# Patient Record
Sex: FEMALE | Race: WHITE | Employment: FULL TIME | URBAN - METROPOLITAN AREA
[De-identification: names, ages, dates, MRNs, and addresses within clinical notes are randomized per-mention and may not be internally consistent; named-entity substitution may affect disease eponyms.]

---

## 2020-02-24 ENCOUNTER — HOSPITAL ENCOUNTER (INPATIENT)
Age: 65
LOS: 4 days | Discharge: HOME | DRG: 603 | End: 2020-02-28
Attending: FAMILY MEDICINE | Admitting: INTERNAL MEDICINE
Payer: COMMERCIAL

## 2020-02-24 ENCOUNTER — APPOINTMENT (OUTPATIENT)
Dept: CT IMAGING | Age: 65
End: 2020-02-24
Attending: EMERGENCY MEDICINE
Payer: COMMERCIAL

## 2020-02-24 ENCOUNTER — HOSPITAL ENCOUNTER (EMERGENCY)
Age: 65
Discharge: ANOTHER HOSPITAL | End: 2020-02-24
Attending: EMERGENCY MEDICINE
Payer: COMMERCIAL

## 2020-02-24 VITALS
DIASTOLIC BLOOD PRESSURE: 76 MMHG | WEIGHT: 204.59 LBS | HEART RATE: 74 BPM | OXYGEN SATURATION: 97 % | HEIGHT: 66 IN | BODY MASS INDEX: 32.88 KG/M2 | RESPIRATION RATE: 16 BRPM | SYSTOLIC BLOOD PRESSURE: 136 MMHG | TEMPERATURE: 98.1 F

## 2020-02-24 DIAGNOSIS — H44.002: ICD-10-CM

## 2020-02-24 DIAGNOSIS — L03.213 PERIORBITAL CELLULITIS OF LEFT EYE: Primary | ICD-10-CM

## 2020-02-24 LAB
ALBUMIN SERPL-MCNC: 4.1 G/DL (ref 3.5–5)
ALBUMIN/GLOB SERPL: 1.1 {RATIO} (ref 1.1–2.2)
ALP SERPL-CCNC: 78 U/L (ref 45–117)
ALT SERPL-CCNC: 18 U/L (ref 12–78)
ANION GAP SERPL CALC-SCNC: 12 MMOL/L (ref 5–15)
AST SERPL-CCNC: 18 U/L (ref 15–37)
BASOPHILS # BLD: 0 K/UL (ref 0–0.1)
BASOPHILS NFR BLD: 0 % (ref 0–1)
BILIRUB SERPL-MCNC: 0.2 MG/DL (ref 0.2–1)
BUN SERPL-MCNC: 23 MG/DL (ref 6–20)
BUN/CREAT SERPL: 24 (ref 12–20)
CALCIUM SERPL-MCNC: 9.5 MG/DL (ref 8.5–10.1)
CHLORIDE SERPL-SCNC: 103 MMOL/L (ref 97–108)
CO2 SERPL-SCNC: 27 MMOL/L (ref 21–32)
CREAT SERPL-MCNC: 0.94 MG/DL (ref 0.55–1.02)
DIFFERENTIAL METHOD BLD: NORMAL
EOSINOPHIL # BLD: 0 K/UL (ref 0–0.4)
EOSINOPHIL NFR BLD: 0 % (ref 0–7)
ERYTHROCYTE [DISTWIDTH] IN BLOOD BY AUTOMATED COUNT: 13.1 % (ref 11.5–14.5)
GLOBULIN SER CALC-MCNC: 3.7 G/DL (ref 2–4)
GLUCOSE SERPL-MCNC: 93 MG/DL (ref 65–100)
HCT VFR BLD AUTO: 40 % (ref 35–47)
HGB BLD-MCNC: 12.8 G/DL (ref 11.5–16)
LYMPHOCYTES # BLD: 1.8 K/UL (ref 0.8–3.5)
LYMPHOCYTES NFR BLD: 22 % (ref 12–49)
MCH RBC QN AUTO: 29.1 PG (ref 26–34)
MCHC RBC AUTO-ENTMCNC: 32 G/DL (ref 30–36.5)
MCV RBC AUTO: 90.9 FL (ref 80–99)
MONOCYTES # BLD: 0.6 K/UL (ref 0–1)
MONOCYTES NFR BLD: 8 % (ref 5–13)
NEUTS SEG # BLD: 5.8 K/UL (ref 1.8–8)
NEUTS SEG NFR BLD: 70 % (ref 32–75)
PLATELET # BLD AUTO: 229 K/UL (ref 150–400)
PMV BLD AUTO: 9.2 FL (ref 8.9–12.9)
POTASSIUM SERPL-SCNC: 3.5 MMOL/L (ref 3.5–5.1)
PROT SERPL-MCNC: 7.8 G/DL (ref 6.4–8.2)
RBC # BLD AUTO: 4.4 M/UL (ref 3.8–5.2)
SODIUM SERPL-SCNC: 142 MMOL/L (ref 136–145)
WBC # BLD AUTO: 8.3 K/UL (ref 3.6–11)

## 2020-02-24 PROCEDURE — 36415 COLL VENOUS BLD VENIPUNCTURE: CPT

## 2020-02-24 PROCEDURE — 96366 THER/PROPH/DIAG IV INF ADDON: CPT

## 2020-02-24 PROCEDURE — 80053 COMPREHEN METABOLIC PANEL: CPT

## 2020-02-24 PROCEDURE — 74011636320 HC RX REV CODE- 636/320: Performed by: EMERGENCY MEDICINE

## 2020-02-24 PROCEDURE — 70481 CT ORBIT/EAR/FOSSA W/DYE: CPT

## 2020-02-24 PROCEDURE — 65660000000 HC RM CCU STEPDOWN

## 2020-02-24 PROCEDURE — 74011000250 HC RX REV CODE- 250: Performed by: EMERGENCY MEDICINE

## 2020-02-24 PROCEDURE — 96365 THER/PROPH/DIAG IV INF INIT: CPT

## 2020-02-24 PROCEDURE — 99285 EMERGENCY DEPT VISIT HI MDM: CPT

## 2020-02-24 PROCEDURE — 74011250636 HC RX REV CODE- 250/636: Performed by: EMERGENCY MEDICINE

## 2020-02-24 PROCEDURE — 85025 COMPLETE CBC W/AUTO DIFF WBC: CPT

## 2020-02-24 PROCEDURE — 96375 TX/PRO/DX INJ NEW DRUG ADDON: CPT

## 2020-02-24 RX ORDER — VANCOMYCIN HYDROCHLORIDE
1250
Status: DISCONTINUED | OUTPATIENT
Start: 2020-02-25 | End: 2020-02-28 | Stop reason: HOSPADM

## 2020-02-24 RX ORDER — KETOROLAC TROMETHAMINE 30 MG/ML
15 INJECTION, SOLUTION INTRAMUSCULAR; INTRAVENOUS
Status: DISPENSED | OUTPATIENT
Start: 2020-02-24 | End: 2020-02-27

## 2020-02-24 RX ORDER — LORAZEPAM 0.5 MG/1
0.5 TABLET ORAL
COMMUNITY

## 2020-02-24 RX ORDER — CIPROFLOXACIN HYDROCHLORIDE 3.5 MG/ML
1 SOLUTION/ DROPS TOPICAL ONCE
Status: COMPLETED | OUTPATIENT
Start: 2020-02-24 | End: 2020-02-24

## 2020-02-24 RX ORDER — OFLOXACIN 3 MG/ML
1 SOLUTION/ DROPS OPHTHALMIC ONCE
Status: DISCONTINUED | OUTPATIENT
Start: 2020-02-24 | End: 2020-02-24 | Stop reason: CLARIF

## 2020-02-24 RX ORDER — MORPHINE SULFATE 2 MG/ML
2 INJECTION, SOLUTION INTRAMUSCULAR; INTRAVENOUS
Status: DISCONTINUED | OUTPATIENT
Start: 2020-02-24 | End: 2020-02-28 | Stop reason: HOSPADM

## 2020-02-24 RX ORDER — CLINDAMYCIN PHOSPHATE 600 MG/50ML
600 INJECTION, SOLUTION INTRAVENOUS
Status: COMPLETED | OUTPATIENT
Start: 2020-02-24 | End: 2020-02-24

## 2020-02-24 RX ORDER — ACETAMINOPHEN 325 MG/1
650 TABLET ORAL
Status: DISCONTINUED | OUTPATIENT
Start: 2020-02-24 | End: 2020-02-28 | Stop reason: HOSPADM

## 2020-02-24 RX ORDER — METHYLPREDNISOLONE 4 MG/1
4 TABLET ORAL
COMMUNITY
Start: 2020-02-23

## 2020-02-24 RX ORDER — PRAVASTATIN SODIUM 20 MG/1
20 TABLET ORAL
COMMUNITY

## 2020-02-24 RX ADMIN — VANCOMYCIN HYDROCHLORIDE 1000 MG: 1 INJECTION, POWDER, LYOPHILIZED, FOR SOLUTION INTRAVENOUS at 19:58

## 2020-02-24 RX ADMIN — VANCOMYCIN HYDROCHLORIDE 1000 MG: 1 INJECTION, POWDER, LYOPHILIZED, FOR SOLUTION INTRAVENOUS at 21:04

## 2020-02-24 RX ADMIN — IOPAMIDOL 100 ML: 755 INJECTION, SOLUTION INTRAVENOUS at 17:52

## 2020-02-24 RX ADMIN — CIPROFLOXACIN 1 DROP: 3 SOLUTION OPHTHALMIC at 20:02

## 2020-02-24 RX ADMIN — CLINDAMYCIN PHOSPHATE 600 MG: 600 INJECTION, SOLUTION INTRAVENOUS at 16:52

## 2020-02-24 NOTE — ED NOTES
IV access established and blood samples obtained for ordered testing. Patient tolerated well. Updated regarding plan of care and associated time constraints; patient verbalizes understanding and agreement. Call bell in reach. Lights dimmed for comfort.

## 2020-02-24 NOTE — ED PROVIDER NOTES
27-year-old female with history of ovarian cancer status post chemotherapy presents with complaint of left eye pain and swelling. She reports she awoke this morning and her eye felt dry like she needed to put drops in it. She used some lubricating drops and shortly after her eyelid began to swell and she had drainage from the eye. She denies any photophobia of the eye. She reports the swelling is increased and has been draining a large amount. She denies any fevers or recent cold symptoms. She was seen at an urgent care over the weekend and got a shot of steroids this past weekend for a sore throat. She is on an oral chemotherapy medication. Patient wears glasses but does not wear contacts. Past Medical History:   Diagnosis Date    Cancer ([facility]     ovarian cancer w/chemo    High cholesterol     [facility]        Past Surgical History:   Procedure Laterality Date    HX BILATERAL MASTECTOMY      HX CHOLECYSTECTOMY      HX HYACINTH AND BSO      HX TONSILLECTOMY           History reviewed. No pertinent family history.     Social History     Socioeconomic History    Marital status: Not on file     Spouse name: Not on file    Number of children: Not on file    Years of education: Not on file    Highest education level: Not on file   Occupational History    Not on file   Social Needs    Financial resource strain: Not on file    Food insecurity:     Worry: Not on file     Inability: Not on file    Transportation needs:     Medical: Not on file     Non-medical: Not on file   Tobacco Use    Smoking status: Never Smoker    Smokeless tobacco: Never Used   Substance and Sexual Activity    Alcohol use: Yes     Comment: rarely    Drug use: Never    Sexual activity: Not on file   Lifestyle    Physical activity:     Days per week: Not on file     Minutes per session: Not on file    Stress: Not on file   Relationships    Social connections:     Talks on phone: Not on file     Gets together: Not on file     Attends Yazidi service: Not on file     Active member of club or organization: Not on file     Attends meetings of clubs or organizations: Not on file     Relationship status: Not on file    Intimate partner violence:     Fear of current or ex partner: Not on file     Emotionally abused: Not on file     Physically abused: Not on file     Forced sexual activity: Not on file   Other Topics Concern    Not on file   Social History Narrative    Not on file         ALLERGIES: Amoxicillin    Review of Systems   Constitutional: Negative for fever. Eyes: Positive for pain. Negative for photophobia and visual disturbance. Respiratory: Negative for shortness of breath. Cardiovascular: Negative for chest pain. All other systems reviewed and are negative. Vitals:    02/24/20 1618   BP: 173/86   Pulse: 83   Resp: 14   Temp: 98.1 °F (36.7 °C)   SpO2: 96%   Weight: 92.8 kg (204 lb 9.4 oz)            Physical Exam  Vitals signs and nursing note reviewed. Constitutional:       Appearance: She is well-developed. HENT:      Head: Normocephalic and atraumatic. Eyes:      Extraocular Movements: Extraocular movements intact. Pupils: Pupils are equal, round, and reactive to light. Comments: Amount of chemosis of patient's left eye with drainage and crusting to her lashes and periorbital swelling and erythema   Neck:      Musculoskeletal: Normal range of motion. Cardiovascular:      Rate and Rhythm: Normal rate and regular rhythm. Pulses: Normal pulses. Heart sounds: Normal heart sounds. No murmur. No friction rub. No gallop. Pulmonary:      Effort: Pulmonary effort is normal. No respiratory distress. Breath sounds: Normal breath sounds. No stridor. No wheezing or rhonchi. Abdominal:      General: Bowel sounds are normal. There is no distension. Palpations: Abdomen is soft. There is no mass. Tenderness: There is no abdominal tenderness.  There is no guarding or rebound. Hernia: No hernia is present. Musculoskeletal: Normal range of motion. Skin:     General: Skin is warm and dry. Neurological:      Mental Status: She is alert and oriented to person, place, and time. Cranial Nerves: No cranial nerve deficit. Sensory: No sensory deficit. Motor: No weakness. Coordination: Coordination normal.          MDM  Number of Diagnoses or Management Options  Abscess of eye, left:   Periorbital cellulitis of left eye:   Diagnosis management comments: Patient with some chemosis of her eye and appears to have a conjunctivitis however I am concerned that it is progressed to at minimum a periorbital cellulitis but may be entering into an orbital cellulitis as she is immunosuppressed. Also appears like it is occurred rapidly. I will obtain imaging. There may be some component of an allergic reaction and inflammatory response of the conjunctiva with ecchymosis post using the over-the-counter eyedrop. Amount and/or Complexity of Data Reviewed  Clinical lab tests: ordered and reviewed  Tests in the radiology section of CPT®: ordered and reviewed  Discuss the patient with other providers: yes (Ophthalmology and hospitalist)    Patient Progress  Patient progress: stable         Procedures      6:35 PM spoke with dr aquino, radiologist who re-reviewed ct and there is no posteiror involvement but pt does have anterior globe fluid collection. Paged Iven Monsalve eye     6:50 PM  Spoke with Dr. Luis Torres, on-call for [facility] and discussed patient CT findings. He agrees with admission for IV antibiotics and will see patient tomorrow in the hospital.  He reports the only hospital they go to an staff at [facility]. I have discussed with the patient she is willing to go there. He also recommends topical antibiotics such as moxifloxacin or ofloxacin.    7:03 PM  Awaiting [facility] Hospitalist to call back.     [facility]  7:05 PM    ED Room Number: WER03/03  Patient Name and age:  Deepika Stephens 62 y.o.  female  Working Diagnosis:   1. Periorbital cellulitis of left eye    2. Abscess of eye, left      Readmission: no  Isolation Requirements:  no  Recommended Level of Care:  med/surg  Code Status:  Full Code  Department:[place] [facility] - [phone]  Other:  Pt with periorbital cellulitis but also has fluid collection anteriorly. Spoke with TERRANCE. They will see her tomorrow at [facility]. They state that is the only place they staff    7:11 PM  Woke with Dr. Luz Jaime hospitalist accepts patient for admission. 1. Periorbital cellulitis of left eye    2.  Abscess of eye, left

## 2020-02-24 NOTE — ED TRIAGE NOTES
Pt ambulates to treatment area she states that this morning when she got up her left eye felt like it needed some moisture drops in it so she put a couple of drops in the eye. Then about 1100 her eye began to become red and have some drainage. Now the left eye is swollen with quite a bit of drainage and is becoming more painful.

## 2020-02-24 NOTE — ED NOTES
IV clindamycin infusing via pump without difficulty as ordered. Patient voices no further requests or concerns at this time. Call bell remains in reach. Will continue to monitor.

## 2020-02-25 LAB
ANION GAP SERPL CALC-SCNC: 7 MMOL/L (ref 5–15)
APPEARANCE UR: CLEAR
BACTERIA URNS QL MICRO: NEGATIVE /HPF
BASOPHILS # BLD: 0 K/UL (ref 0–0.1)
BASOPHILS NFR BLD: 0 % (ref 0–1)
BILIRUB UR QL: NEGATIVE
BUN SERPL-MCNC: 17 MG/DL (ref 6–20)
BUN/CREAT SERPL: 18 (ref 12–20)
CALCIUM SERPL-MCNC: 8.8 MG/DL (ref 8.5–10.1)
CHLORIDE SERPL-SCNC: 105 MMOL/L (ref 97–108)
CHOLEST SERPL-MCNC: 212 MG/DL
CO2 SERPL-SCNC: 26 MMOL/L (ref 21–32)
COLOR UR: ABNORMAL
CREAT SERPL-MCNC: 0.96 MG/DL (ref 0.55–1.02)
CRP SERPL-MCNC: <0.29 MG/DL (ref 0–0.6)
DIFFERENTIAL METHOD BLD: NORMAL
EOSINOPHIL # BLD: 0.1 K/UL (ref 0–0.4)
EOSINOPHIL NFR BLD: 1 % (ref 0–7)
EPITH CASTS URNS QL MICRO: ABNORMAL /LPF
ERYTHROCYTE [DISTWIDTH] IN BLOOD BY AUTOMATED COUNT: 13.2 % (ref 11.5–14.5)
GLUCOSE SERPL-MCNC: 90 MG/DL (ref 65–100)
GLUCOSE UR STRIP.AUTO-MCNC: NEGATIVE MG/DL
HCT VFR BLD AUTO: 37.3 % (ref 35–47)
HDLC SERPL-MCNC: 49 MG/DL
HDLC SERPL: 4.3 {RATIO} (ref 0–5)
HGB BLD-MCNC: 12.1 G/DL (ref 11.5–16)
HGB UR QL STRIP: NEGATIVE
HYALINE CASTS URNS QL MICRO: ABNORMAL /LPF (ref 0–5)
IMM GRANULOCYTES # BLD AUTO: 0 K/UL (ref 0–0.04)
IMM GRANULOCYTES NFR BLD AUTO: 0 % (ref 0–0.5)
KETONES UR QL STRIP.AUTO: NEGATIVE MG/DL
LDLC SERPL CALC-MCNC: 122.2 MG/DL (ref 0–100)
LEUKOCYTE ESTERASE UR QL STRIP.AUTO: ABNORMAL
LIPID PROFILE,FLP: ABNORMAL
LYMPHOCYTES # BLD: 2 K/UL (ref 0.8–3.5)
LYMPHOCYTES NFR BLD: 30 % (ref 12–49)
MAGNESIUM SERPL-MCNC: 1.4 MG/DL (ref 1.6–2.4)
MCH RBC QN AUTO: 29.5 PG (ref 26–34)
MCHC RBC AUTO-ENTMCNC: 32.4 G/DL (ref 30–36.5)
MCV RBC AUTO: 91 FL (ref 80–99)
MONOCYTES # BLD: 0.7 K/UL (ref 0–1)
MONOCYTES NFR BLD: 10 % (ref 5–13)
NEUTS SEG # BLD: 3.9 K/UL (ref 1.8–8)
NEUTS SEG NFR BLD: 59 % (ref 32–75)
NITRITE UR QL STRIP.AUTO: NEGATIVE
NRBC # BLD: 0 K/UL (ref 0–0.01)
NRBC BLD-RTO: 0 PER 100 WBC
PH UR STRIP: 6 [PH] (ref 5–8)
PHOSPHATE SERPL-MCNC: 4.3 MG/DL (ref 2.6–4.7)
PLATELET # BLD AUTO: 216 K/UL (ref 150–400)
PMV BLD AUTO: 9.6 FL (ref 8.9–12.9)
POTASSIUM SERPL-SCNC: 3.6 MMOL/L (ref 3.5–5.1)
PROT UR STRIP-MCNC: NEGATIVE MG/DL
RBC # BLD AUTO: 4.1 M/UL (ref 3.8–5.2)
RBC #/AREA URNS HPF: ABNORMAL /HPF (ref 0–5)
SODIUM SERPL-SCNC: 138 MMOL/L (ref 136–145)
SP GR UR REFRACTOMETRY: 1.02 (ref 1–1.03)
TRIGL SERPL-MCNC: 204 MG/DL (ref ?–150)
UROBILINOGEN UR QL STRIP.AUTO: 0.2 EU/DL (ref 0.2–1)
VLDLC SERPL CALC-MCNC: 40.8 MG/DL
WBC # BLD AUTO: 6.6 K/UL (ref 3.6–11)
WBC URNS QL MICRO: ABNORMAL /HPF (ref 0–4)

## 2020-02-25 PROCEDURE — 74011250636 HC RX REV CODE- 250/636: Performed by: INTERNAL MEDICINE

## 2020-02-25 PROCEDURE — 83735 ASSAY OF MAGNESIUM: CPT

## 2020-02-25 PROCEDURE — 94760 N-INVAS EAR/PLS OXIMETRY 1: CPT

## 2020-02-25 PROCEDURE — 36415 COLL VENOUS BLD VENIPUNCTURE: CPT

## 2020-02-25 PROCEDURE — 81001 URINALYSIS AUTO W/SCOPE: CPT

## 2020-02-25 PROCEDURE — 80061 LIPID PANEL: CPT

## 2020-02-25 PROCEDURE — 84100 ASSAY OF PHOSPHORUS: CPT

## 2020-02-25 PROCEDURE — 74011000258 HC RX REV CODE- 258: Performed by: INTERNAL MEDICINE

## 2020-02-25 PROCEDURE — 74011250637 HC RX REV CODE- 250/637: Performed by: INTERNAL MEDICINE

## 2020-02-25 PROCEDURE — 85025 COMPLETE CBC W/AUTO DIFF WBC: CPT

## 2020-02-25 PROCEDURE — 87040 BLOOD CULTURE FOR BACTERIA: CPT

## 2020-02-25 PROCEDURE — 65270000029 HC RM PRIVATE

## 2020-02-25 PROCEDURE — 80048 BASIC METABOLIC PNL TOTAL CA: CPT

## 2020-02-25 PROCEDURE — 86140 C-REACTIVE PROTEIN: CPT

## 2020-02-25 RX ORDER — BISACODYL 5 MG
5 TABLET, DELAYED RELEASE (ENTERIC COATED) ORAL DAILY PRN
Status: DISCONTINUED | OUTPATIENT
Start: 2020-02-25 | End: 2020-02-28 | Stop reason: HOSPADM

## 2020-02-25 RX ORDER — ONDANSETRON 2 MG/ML
4 INJECTION INTRAMUSCULAR; INTRAVENOUS
Status: DISCONTINUED | OUTPATIENT
Start: 2020-02-25 | End: 2020-02-28 | Stop reason: HOSPADM

## 2020-02-25 RX ORDER — SODIUM CHLORIDE 0.9 % (FLUSH) 0.9 %
5-40 SYRINGE (ML) INJECTION EVERY 8 HOURS
Status: DISCONTINUED | OUTPATIENT
Start: 2020-02-25 | End: 2020-02-28 | Stop reason: HOSPADM

## 2020-02-25 RX ORDER — SODIUM CHLORIDE 0.9 % (FLUSH) 0.9 %
5-40 SYRINGE (ML) INJECTION AS NEEDED
Status: DISCONTINUED | OUTPATIENT
Start: 2020-02-25 | End: 2020-02-28 | Stop reason: HOSPADM

## 2020-02-25 RX ORDER — PRAVASTATIN SODIUM 20 MG/1
20 TABLET ORAL
Status: DISCONTINUED | OUTPATIENT
Start: 2020-02-25 | End: 2020-02-28 | Stop reason: HOSPADM

## 2020-02-25 RX ORDER — LORAZEPAM 0.5 MG/1
0.5 TABLET ORAL DAILY PRN
Status: DISCONTINUED | OUTPATIENT
Start: 2020-02-25 | End: 2020-02-28 | Stop reason: HOSPADM

## 2020-02-25 RX ORDER — METRONIDAZOLE 500 MG/100ML
500 INJECTION, SOLUTION INTRAVENOUS EVERY 8 HOURS
Status: DISCONTINUED | OUTPATIENT
Start: 2020-02-25 | End: 2020-02-28 | Stop reason: HOSPADM

## 2020-02-25 RX ADMIN — ONDANSETRON 4 MG: 2 INJECTION INTRAMUSCULAR; INTRAVENOUS at 14:41

## 2020-02-25 RX ADMIN — Medication 10 ML: at 13:05

## 2020-02-25 RX ADMIN — VANCOMYCIN HYDROCHLORIDE 1250 MG: 10 INJECTION, POWDER, LYOPHILIZED, FOR SOLUTION INTRAVENOUS at 13:05

## 2020-02-25 RX ADMIN — PRAVASTATIN SODIUM 20 MG: 20 TABLET ORAL at 22:20

## 2020-02-25 RX ADMIN — ONDANSETRON 4 MG: 2 INJECTION INTRAMUSCULAR; INTRAVENOUS at 20:48

## 2020-02-25 RX ADMIN — CEFEPIME HYDROCHLORIDE 2 G: 2 INJECTION, POWDER, FOR SOLUTION INTRAVENOUS at 01:22

## 2020-02-25 RX ADMIN — Medication 10 ML: at 20:49

## 2020-02-25 RX ADMIN — LORAZEPAM 0.5 MG: 0.5 TABLET ORAL at 21:32

## 2020-02-25 RX ADMIN — ONDANSETRON 4 MG: 2 INJECTION INTRAMUSCULAR; INTRAVENOUS at 10:48

## 2020-02-25 RX ADMIN — CEFEPIME HYDROCHLORIDE 2 G: 2 INJECTION, POWDER, FOR SOLUTION INTRAVENOUS at 11:55

## 2020-02-25 RX ADMIN — Medication 10 ML: at 15:56

## 2020-02-25 RX ADMIN — METRONIDAZOLE 500 MG: 500 INJECTION, SOLUTION INTRAVENOUS at 19:15

## 2020-02-25 NOTE — PROGRESS NOTES
Problem: Pain  Goal: *Control of Pain  Outcome: Progressing Towards Goal     Problem: Patient Education: Go to Patient Education Activity  Goal: Patient/Family Education  Outcome: Progressing Towards Goal     Problem: General Infection Care Plan (Adult and Pediatric)  Goal: Improvement in signs and symptoms of infection  Outcome: Progressing Towards Goal  Goal: *Optimize nutritional status  Outcome: Progressing Towards Goal     Problem: Patient Education: Go to Patient Education Activity  Goal: Patient/Family Education  Outcome: Progressing Towards Goal     Problem: General Medical Care Plan  Goal: *Vital signs within specified parameters  Outcome: Progressing Towards Goal  Goal: *Labs within defined limits  Outcome: Progressing Towards Goal  Goal: *Absence of infection signs and symptoms  Outcome: Progressing Towards Goal  Goal: *Optimal pain control at patient's stated goal  Outcome: Progressing Towards Goal  Goal: *Skin integrity maintained  Outcome: Progressing Towards Goal  Goal: *Fluid volume balance  Outcome: Progressing Towards Goal  Goal: *Optimize nutritional status  Outcome: Progressing Towards Goal  Goal: *Anxiety reduced or absent  Outcome: Progressing Towards Goal  Goal: *Progressive mobility and function (eg: ADL's)  Outcome: Progressing Towards Goal     Problem: Patient Education: Go to Patient Education Activity  Goal: Patient/Family Education  Outcome: Progressing Towards Goal     Problem: Falls - Risk of  Goal: *Absence of Falls  Description  Document Willie Jama Fall Risk and appropriate interventions in the flowsheet.   Outcome: Progressing Towards Goal  Note: Fall Risk Interventions:            Medication Interventions: Assess postural VS orthostatic hypotension, Evaluate medications/consider consulting pharmacy, Patient to call before getting OOB, Teach patient to arise slowly, Utilize gait belt for transfers/ambulation         History of Falls Interventions: Consult care management for discharge planning, Door open when patient unattended, Evaluate medications/consider consulting pharmacy, Investigate reason for fall, Room close to nurse's station, Utilize gait belt for transfer/ambulation, Assess for delayed presentation/identification of injury for 48 hrs (comment for end date), Vital signs minimum Q4HRs X 24 hrs (comment for end date)         Problem: Patient Education: Go to Patient Education Activity  Goal: Patient/Family Education  Outcome: Progressing Towards Goal     Problem: Discharge Planning  Goal: *Discharge to safe environment  Outcome: Progressing Towards Goal  Goal: *Knowledge of medication management  Outcome: Progressing Towards Goal  Goal: *Knowledge of discharge instructions  Outcome: Progressing Towards Goal     Problem: Patient Education: Go to Patient Education Activity  Goal: Patient/Family Education  Outcome: Progressing Towards Goal

## 2020-02-25 NOTE — PROGRESS NOTES
ID consult received     Patient seen     Facial, preseptal cellulitis and fluid collection on CT    Check blood cx , has port     Add flagyl to Vancomycin and cefepime    Edema improved of L orbital area per patient and now able to open eyes    Recommend assessment for drainage with cultures sent for bacterial, aerobic, anaerobic, fungal , afb smear and cultures    She is immune suppressed on oral chemotherapy    If worse, will need to consider  add antifungal as well as  risk for mucor given immune suppression but she reports improving on antibiotics     D/W with primary team today     Full consult to follow    Luci Darby DO  1:44 PM

## 2020-02-25 NOTE — PROGRESS NOTES
Dual Assessment completed with SCOTTY Mueller. Patient has an abrasion on her L/elbow and L/knee. L/eye is red and swollen. Otherwise skin is intact.

## 2020-02-25 NOTE — CONSULTS
Ophthalmology Consult    Subjective:      Jacques Fall is a 62 y.o. female who presents for evaluation of pre septal cellulitis with possible fluid collection of left side. Started with irritation and eye discharge of left side yesterday. She used some artificial tears which she has used many times prior. Rapidly progressed to involve swelling of left eyelids and face to the point where she could not open her eye. Presented to outside ER. CT face showed pre septal cellulitis with possible fluid collection anterior to the orbit, with no posterior involvement. Was then transferred to [facility] for IV abx and further management. Pt denies any subjective fevers, or decreased PO intake. She has been on IV vanc/cefepime, and feels improved overnight. She is concerned that her right eye is having some discharge, and may have some lid swelling and redness of the right side. Of note, pt is on chronic PO chemotherapy (lynparza) for ovarian cancer. H/o LASIK in both eyes 20 years ago, and [address] in right eye 5 years ago. No other ocular surgeries/history, denies any recent ocular trauma. Onset was 1 day ago. Symptoms have been rapidly worsening since. Aggravating factors: none. Alleviating factors: none. Associated symptoms: facial swelling. Past Medical History:   Diagnosis Date    Cancer ([facility] [address]     ovarian cancer w/chemo    High cholesterol     Thromboembolus ([facility]      Past Surgical History:   Procedure Laterality Date    HX BILATERAL MASTECTOMY      HX CHOLECYSTECTOMY      HX HYACINTH AND BSO      HX TONSILLECTOMY        No family history on file.   Social History     Tobacco Use    Smoking status: Never Smoker    Smokeless tobacco: Never Used   Substance Use Topics    Alcohol use: Yes     Comment: rarely      Allergies   Allergen Reactions    Amoxicillin Hives     Pt states all cillins     Current Facility-Administered Medications   Medication Dose Route Frequency    LORazepam (ATIVAN) tablet 0.5 mg 0.5 mg Oral DAILY PRN    pravastatin (PRAVACHOL) tablet 20 mg  20 mg Oral QHS    sodium chloride (NS) flush 5-40 mL  5-40 mL IntraVENous Q8H    sodium chloride (NS) flush 5-40 mL  5-40 mL IntraVENous PRN    ondansetron (ZOFRAN) injection 4 mg  4 mg IntraVENous Q4H PRN    bisacodyL (DULCOLAX) tablet 5 mg  5 mg Oral DAILY PRN    metroNIDAZOLE (FLAGYL) IVPB premix 500 mg  500 mg IntraVENous Q8H    acetaminophen (TYLENOL) tablet 650 mg  650 mg Oral Q4H PRN    ketorolac (TORADOL) injection 15 mg  15 mg IntraVENous Q6H PRN    morphine injection 2 mg  2 mg IntraVENous Q4H PRN    vancomycin (VANCOCIN) 1250 mg in  ml infusion  1,250 mg IntraVENous Q16H    cefepime (MAXIPIME) 2 g in 0.9% sodium chloride (MBP/ADV) 100 mL  2 g IntraVENous Q12H    Vancomycin - pharmacy to dose   Other Rx Dosing/Monitoring         Review of Systems:   Pertinent items are noted in the History of Present Illness. Objective:     Visit Vitals  /75 (BP 1 Location: Left arm, BP Patient Position: At rest)   Pulse 90   Temp 99.1 °F (37.3 °C)   Resp 15   Wt 91.3 kg (201 lb 3.2 oz)   SpO2 96%   BMI 32.47 kg/m²       Physical Exam: OD = right eye, OS = left eye, OU = both eyes  Visual acuity (near, OTC readers)  OD 20/50  OS 20/40    Pupils 5--3, brisk with no rAPD OU  IOP: soft to palpation OU  EOMs full OU with no pain  Confrontational visual fields full OU    External: Left periorbital edema/erythema, upper and lower eyelid edema.   Conj/sclera: Trace injection OD, 1+ injection with mild chemosis and scant discharge OS  Cornea: clear OU  Anterior chamber: deep OU  Iris: round OU  Lens: nuclear sclerosis OU  Vitreous; clear OU with no vitritis    Dilated Exam  Optic nerve: c/d 0.4, wnl OU  Macula: wnl OU  Periphery: wnl OU    Assessment:     Preseptal cellulitis of left eyelids, with fluid collection anterior to the left globe  -rapidly progressive over course of 1 day, pt is immunocompromised on chronic PO chemo  -improved overnight w IV vanc/cefepime. Plan to add flagyl per ID recs      Plan:     -given clinical improvement, no indication for drainage of possible fluid collection. No obvious fluctuant mass on exam  -cont antimicrobials per ID  -if she worsens, will need to consider re-imaging to assess for abscess, or consider invasive fungal infection    Pt advised to call [facility] at discharge for outpatient followup. Please page through [facility] on call number if she needs repeat ophthalmic evaluation while inpatient.       Signed By: Boston Castellano MD     February 25, 2020

## 2020-02-25 NOTE — PROGRESS NOTES
TRANSFER - OUT REPORT:    Verbal report given to NETO Freire (name) on Clari Chase being transferred to [facility](unit) for routine progression of care       Report consisted of patients Situation, Background, Assessment and   Recommendations(SBAR). Information from the following report(s) SBAR, Kardex, Intake/Output, MAR, Recent Results and Cardiac Rhythm NSR was reviewed with the receiving nurse. Lines:   Peripheral IV 02/25/20 Right Antecubital (Active)   Site Assessment Clean, dry, & intact 2/25/2020 12:00 PM   Phlebitis Assessment 0 2/25/2020 12:00 PM   Infiltration Assessment 0 2/25/2020 12:00 PM   Dressing Status Clean, dry, & intact 2/25/2020 12:00 PM   Dressing Type Transparent;Tape 2/25/2020 12:00 PM   Hub Color/Line Status Blue;Flushed; Infusing 2/25/2020 12:00 PM   Action Taken Open ports on tubing capped 2/25/2020 12:00 PM   Alcohol Cap Used Yes 2/25/2020 12:00 PM        Opportunity for questions and clarification was provided.       Patient transported with:   Registered Nurse

## 2020-02-25 NOTE — ACP (ADVANCE CARE PLANNING)
visit for Advance Medical Directive (AMD) consult. Pt was the bed and shared she is interested in an AMD.  explain paperwork and answered questions. Pt shared that she wanted to look over the paperwork and talk with her daughter who would be her agent before completing.  explained process of completing paperwork and let her know to have [phone] miesha contacted if she had any questions or wanted to complete paperwork.  follow up as needed.      Marta Dumont, [facility]   [phone])

## 2020-02-25 NOTE — PROGRESS NOTES
Problem: Pain  Goal: *Control of Pain  Outcome: Progressing Towards Goal  Goal: *PALLIATIVE CARE:  Alleviation of Pain  Outcome: Progressing Towards Goal     Problem: Patient Education: Go to Patient Education Activity  Goal: Patient/Family Education  Outcome: Progressing Towards Goal     Problem: General Infection Care Plan (Adult and Pediatric)  Goal: Improvement in signs and symptoms of infection  Outcome: Progressing Towards Goal  Goal: *Optimize nutritional status  Outcome: Progressing Towards Goal     Problem: Patient Education: Go to Patient Education Activity  Goal: Patient/Family Education  Outcome: Progressing Towards Goal     Problem: General Medical Care Plan  Goal: *Vital signs within specified parameters  Outcome: Progressing Towards Goal  Goal: *Labs within defined limits  Outcome: Progressing Towards Goal  Goal: *Absence of infection signs and symptoms  Outcome: Progressing Towards Goal  Goal: *Optimal pain control at patient's stated goal  Outcome: Progressing Towards Goal  Goal: *Skin integrity maintained  Outcome: Progressing Towards Goal  Goal: *Fluid volume balance  Outcome: Progressing Towards Goal  Goal: *Optimize nutritional status  Outcome: Progressing Towards Goal  Goal: *Anxiety reduced or absent  Outcome: Progressing Towards Goal  Goal: *Progressive mobility and function (eg: ADL's)  Outcome: Progressing Towards Goal     Problem: Patient Education: Go to Patient Education Activity  Goal: Patient/Family Education  Outcome: Progressing Towards Goal

## 2020-02-25 NOTE — H&P
[address]  HISTORY AND PHYSICAL    Name:  Amado Uriostegui  MR#:  [id number]  :  1955  ACCOUNT #:  [de-identified]  ADMIT DATE:  2020      The patient was seen, evaluated, and admitted by me on 2020. PRIMARY CARE PHYSICIAN:  Adrian Heck MD    SOURCE OF INFORMATION:  The patient. CHIEF COMPLAINT:  Swelling and redness of the left eye. HISTORY OF PRESENT ILLNESS:  This 76-year-old woman with past medical history significant for ovarian cancer; on oral chemotherapy and dyslipidemia was in her usual state of health until the day of her presentation at the emergency room when the patient developed sudden onset of swelling and redness in the left eye. No history of trauma. The patient woke up with the symptoms. The swelling is associated with drainage and itchiness. No fever. No rigors. No chills. A couple of days ago, the patient was seen at the [facility] and received a shot of steroid for sore throat. The patient went to [facility] emergency room at [place] for evaluation. When the patient arrived at the emergency room, CT scan of the maxillofacial was obtained. The CT scan shows soft tissue swelling anterior to the left orbit. Abscess collection was suggested. The emergency room physician consulted ophthalmologist on call. Admission to the hospital for broad-spectrum antibiotic coverage was advised. The patient was directly admitted from [facility] to [facility].  The patient has no record of prior admission to this hospital.    PAST MEDICAL HISTORY:  Ovarian cancer, dyslipidemia, and thromboembolism. ALLERGIES:  THE PATIENT IS ALLERGIC TO AMOXICILLIN. MEDICATIONS:  1.  Olaparib 300 mg twice daily. 2.  Tapering dose of prednisone. 3.  Pravachol 20 mg daily. 4.  Ativan 0.5 mg daily as needed. FAMILY HISTORY:  This was reviewed. No family history of any significant medical disease.     PAST SURGICAL HISTORY:  This is significant for cholecystectomy, total abdominal hysterectomy, and bilateral mastectomy. SOCIAL HISTORY:  No history of alcohol or tobacco abuse. REVIEW OF SYSTEMS:  HEAD, EYES, EARS, NOSE AND THROAT:  No headache, no dizziness, no blurring of vision, and no photophobia. RESPIRATORY SYSTEM:  No cough, no shortness of breath, and no hemoptysis. CARDIOVASCULAR SYSTEM:  No chest pain, no orthopnea, and no palpitation. GASTROINTESTINAL SYSTEM:  No nausea or vomiting. No diarrhea. No constipation. GENITOURINARY SYSTEM:  No dysuria, no urgency, and no frequency. All other systems are reviewed and they are negative. PHYSICAL EXAMINATION:  GENERAL APPEARANCE:  The patient appeared ill and in moderate distress. VITAL SIGNS:  Temperature 97.7, pulse 85, blood pressure 152/92, respiratory rate 16, and oxygen saturation 98%. HEAD:  Normocephalic, atraumatic. EYES:  Swelling and redness, left periorbital region. EARS:  Normal external ears with no obvious drainage. NOSE:  No deformity. No drainage. MOUTH AND THROAT:  No visible oral lesion. NECK:  Neck is supple. No JVD. No thyromegaly. CHEST:  Clear breath sounds. No wheezing. No crackles. HEART:  Normal S1 and S2.  Regular. No clinically appreciable murmur. ABDOMEN:  Soft and nontender. Normal bowel sounds. CNS:  Alert and oriented x3. No gross focal neurological deficit. EXTREMITIES:  No edema. Pulses 2+ bilaterally. MUSCULOSKELETAL SYSTEM:  No obvious joint deformity and swelling. SKIN:  Left periorbital swelling, redness, and tenderness noted and present on admission. PSYCHIATRY:  Normal mood and affect. LYMPHATIC SYSTEM:  No cervical lymphadenopathy. DIAGNOSTIC DATA:  The CT scan of the orbit shows left periorbital swelling with possible abscess formation. LABORATORY DATA:  Hematology; WBC 8.3, hemoglobin 12.8, hematocrit 40.4, platelets 875.   Chemistry; sodium 142, potassium 3.5, chloride 103, CO2 is 27, glucose 93, creatinine 0.94, total bilirubin 0.2, total protein 7.8, albumin level 4.1, globulin 3.7, ALT 18, AST 18, and alkaline phosphatase 78. ASSESSMENT:  1. Left periorbital cellulitis/abscess. 2.  Ovarian cancer. 3.  Dyslipidemia. 4.  Elevated blood pressure. PLAN:  1. Left periorbital cellulitis/abscess. We will admit the patient for further evaluation and treatment. We will start the patient on vancomycin and cefepime. We will carry out pain control. We will also carry out supportive therapy. We will await further recommendation from the ophthalmologist consulted by the emergency room physician. Infectious Disease consult will also be requested to assist in further evaluation and treatment. 2.  Ovarian cancer. We will hold oral chemotherapy in the setting of active infection. The patient will follow up with her oncologist upon discharge from the hospital for continuation of care. 3.  Dyslipidemia. We will resume preadmission medication. We will check lipid profile. 4.  Elevated blood pressure. The patient has no history of hypertension. We will monitor the patient's blood pressure closely. If the average blood pressure reading remains elevated, the patient may require antihypertensive medication at the time of discharge to home. 5.  Other issues. Code status, the patient is a full code. We will request SCD for DVT prophylaxis. FUNCTIONAL STATUS PRIOR TO ADMISSION:  The patient came from home. The patient is ambulatory with no assistant or device. Denys Epley, MD      [id number]  D:  02/25/2020 4:32  T:  02/25/2020 5:39  JOB #:  [id number]  CC:   Stefano Andre MD

## 2020-02-25 NOTE — PROGRESS NOTES
Discussed case with ER physician. Per phone conversation, concern for rapidly progressing pre septal cellulitis in immunosuppressed patient on chronic oral chemotherapy. rec IV abx vanc + zosyn (or other second agent per primary team/ID pending patient's immune status). Will evaluate patient tomorrow and discuss with oculoplastics as needed regarding possible abscess    If there is any concern for rapid progression / sinus involvement, low threshold to reimage and consult ENT to rule out mucormycosis in this immunocompromised patient.      Julio Goldman MD  Ophthalmology

## 2020-02-25 NOTE — CONSULTS
Infectious Disease Consult Note    Reason for Consult: orbital/facial cellulitis   Date of Consultation: February 25, 2020  Date of Admission: 2/24/2020  Referring Physician: Dr Mark Medina       HPI:    Ms Yahaira Donaldson is a 81-year-old immune suppressed lady with breast cancer, ovarian cancer status post bilateral mastectomy, history of intraperitoneal and systemic chemotherapy with an indwelling port who has been on oral chemotherapy agent admitted with facial erythema, and pressure. She reports last week she was at a concert with her daughter. Her daughter says that she had some tearing and was crying at the concert/rubbing her eyes. They noticed erythema in the left eye that looked like conjunctivitis. Went to an outpatient facility and was given a steroid shot per the patient. Eventually she was directed to an emergency room at [facility] [place] [facility] and transferred to [facility] given concerns for orbital cellulitis. She reports that her eyelid on the left side was completely shut last night. She states that it is better today and she is able to open her eyes. She reports pressure behind the eyes but no mariela pain. She has noticed some discharge but no purulence. She denies any loss of vision or hearing. She denies a headache. She admits to having had fevers and chills which are better. She denies any other symptoms at this time other than facial erythema and edema. She had was started on vancomycin and cefepime to which I added Flagyl earlier in the day. She has had a CT of her orbits done so far. She denied cough, any other upper respiratory symptoms, breathing difficulty, chest pain, nausea vomiting diarrhea, dysuria, back pain, other neurological symptoms. In regards to medications, she has been on Olaparib.            Past Medical History:  Past Medical History:   Diagnosis Date    Cancer ([address]     ovarian cancer w/chemo    High cholesterol     Thromboembolus [facility])          Surgical History:  Past Surgical History:   Procedure Laterality Date    HX BILATERAL MASTECTOMY      HX CHOLECYSTECTOMY      HX HYACINTH AND BSO      HX TONSILLECTOMY           Family History:   Daughter with a history of malignancy    Social History:     · Living Situation: At home, continues to work  · Tobacco: Denied  · Alcohol: Denied  · Illicit Drugs: Denied  · Sexual History: Past  · Travel History denied any travel out of the country or exposure to anyone with contacts who have traveled recently  · Exposures:   · Outdoor/Hiking: denied  · Animal/Pet: 2 dogs that are vaccinated  · Construction: denied  · Hot Tub: denied   · Brackish/stagnant exposure: denied  · TB exposure: denied  · Sick Contacts: denied     Allergies: Allergies   Allergen Reactions    Amoxicillin Hives     Pt states all cillins         Review of Systems:    10 point review of systems obtained  Pertinent positives per HPI  All others negative    Medications:  Current Facility-Administered Medications on File Prior to Encounter   Medication Dose Route Frequency Provider Last Rate Last Dose    [COMPLETED] vancomycin (VANCOCIN) 1,000 mg in 0.9% sodium chloride (MBP/ADV) 250 mL  1 g IntraVENous NOW Dale Cogan, MD   Stopped at 02/24/20 2107    And    [COMPLETED] vancomycin (VANCOCIN) 1,000 mg in 0.9% sodium chloride (MBP/ADV) 250 mL  1 g IntraVENous NOW Dale Cogan, MD   Stopped at 02/24/20 2120    [COMPLETED] ciprofloxacin HCl (CILOXAN) 0.3 % ophthalmic solution 1 Drop  1 Drop Left Eye ONCE Dale Cogan, MD   1 Drop at 02/24/20 2002     Current Outpatient Medications on File Prior to Encounter   Medication Sig Dispense Refill    pravastatin (PRAVACHOL) 20 mg tablet Take 20 mg by mouth nightly.  olaparib (LYNPARZA) 150 mg tablet Take 300 mg by mouth two (2) times a day.  LORazepam (ATIVAN) 0.5 mg tablet Take 0.5 mg by mouth.  methylPREDNISolone (MEDROL DOSEPACK) 4 mg tablet Take 4 mg by mouth Specific Days and Specific Times. Current Facility-Administered Medications:     LORazepam (ATIVAN) tablet 0.5 mg, 0.5 mg, Oral, DAILY PRN, Yolanda Alexander MD    pravastatin (PRAVACHOL) tablet 20 mg, 20 mg, Oral, QHS, Yolanda Alexander MD    sodium chloride (NS) flush 5-40 mL, 5-40 mL, IntraVENous, Q8H, Yolanda Alexander MD, 10 mL at 02/25/20 1305    sodium chloride (NS) flush 5-40 mL, 5-40 mL, IntraVENous, PRN, Yolanda Alexander MD, 10 mL at 02/25/20 1556    ondansetron (ZOFRAN) injection 4 mg, 4 mg, IntraVENous, Q4H PRN, Yolanda Alexander MD, 4 mg at 02/25/20 1441    bisacodyL (DULCOLAX) tablet 5 mg, 5 mg, Oral, DAILY PRN, Yolanda Machado MD    metroNIDAZOLE (FLAGYL) IVPB premix 500 mg, 500 mg, IntraVENous, Q8H, Luci Darby,     acetaminophen (TYLENOL) tablet 650 mg, 650 mg, Oral, Q4H PRN, Yolanda Machado MD    ketorolac (TORADOL) injection 15 mg, 15 mg, IntraVENous, Q6H PRN, Yolanda Alexander MD    morphine injection 2 mg, 2 mg, IntraVENous, Q4H PRN, Yolanda Alexander MD    vancomycin (VANCOCIN) 1250 mg in  ml infusion, 1,250 mg, IntraVENous, Q16H, Yolanda Alexander MD, Last Rate: 125 mL/hr at 02/25/20 1305, 1,250 mg at 02/25/20 1305    cefepime (MAXIPIME) 2 g in 0.9% sodium chloride (MBP/ADV) 100 mL, 2 g, IntraVENous, Q12H, Yolanda Alexander MD, Last Rate: 200 mL/hr at 02/25/20 1155, 2 g at 02/25/20 1155    Vancomycin - pharmacy to dose, , Other, Rx Dosing/Monitoring, Kalyan Logan MD        Physical Exam:    Vitals:   Patient Vitals for the past 24 hrs:   Temp Pulse Resp BP SpO2   02/25/20 1529 99.1 °F (37.3 °C) 90 15 117/75 96 %   02/25/20 1352 98.1 °F (36.7 °C) 92 14 135/81 94 %   02/25/20 0953 98.3 °F (36.8 °C) 86 12 121/76 93 %   02/25/20 0600 97.6 °F (36.4 °C) 92 14 126/66 97 %   02/25/20 0200 97.7 °F (36.5 °C) 82 15 138/81 96 %   02/24/20 2219 97.7 °F (36.5 °C) 85 16 (!) 152/92 98 %   ·   · GEN: NAD  · HEENT erythema around bilateral orbits, Edema of the left orbit greater than the right, eyes are open, extraocular muscles intact , no thrush, no exudates, no stridor, no discharge from the eye noted earlier in the  · CV: S1, S2 heard regularly,   · Lungs: Clear to auscultation bilaterally  · Abdomen: soft, non distended, non tender,   · Extremities: no edema  · Neuro: Alert, oriented to time, place and situation, moves all extremities to commands, verbal   · Skin: no rash  · Psych: good affect, good eye contact, non tearful   · Musculoskeletal: Port on the right chest wall without any erythema around it, nontender to palpation of back      Labs:   Recent Results (from the past 24 hour(s))   METABOLIC PANEL, BASIC    Collection Time: 02/25/20  1:21 AM   Result Value Ref Range    Sodium 138 136 - 145 mmol/L    Potassium 3.6 3.5 - 5.1 mmol/L    Chloride 105 97 - 108 mmol/L    CO2 26 21 - 32 mmol/L    Anion gap 7 5 - 15 mmol/L    Glucose 90 65 - 100 mg/dL    BUN 17 6 - 20 MG/DL    Creatinine 0.96 0.55 - 1.02 MG/DL    BUN/Creatinine ratio 18 12 - 20      GFR est AA >60 >60 ml/min/1.73m2    GFR est non-AA 59 (L) >60 ml/min/1.73m2    Calcium 8.8 8.5 - 10.1 MG/DL   C REACTIVE PROTEIN, QT    Collection Time: 02/25/20  1:21 AM   Result Value Ref Range    C-Reactive protein <0.29 0.00 - 0.60 mg/dL   CBC WITH AUTOMATED DIFF    Collection Time: 02/25/20  1:21 AM   Result Value Ref Range    WBC 6.6 3.6 - 11.0 K/uL    RBC 4.10 3.80 - 5.20 M/uL    HGB 12.1 11.5 - 16.0 g/dL    HCT 37.3 35.0 - 47.0 %    MCV 91.0 80.0 - 99.0 FL    MCH 29.5 26.0 - 34.0 PG    MCHC 32.4 30.0 - 36.5 g/dL    RDW 13.2 11.5 - 14.5 %    PLATELET 914 697 - 393 K/uL    MPV 9.6 8.9 - 12.9 FL    NRBC 0.0 0  WBC    ABSOLUTE NRBC 0.00 0.00 - 0.01 K/uL    NEUTROPHILS 59 32 - 75 %    LYMPHOCYTES 30 12 - 49 %    MONOCYTES 10 5 - 13 %    EOSINOPHILS 1 0 - 7 %    BASOPHILS 0 0 - 1 %    IMMATURE GRANULOCYTES 0 0.0 - 0.5 %    ABS. NEUTROPHILS 3.9 1.8 - 8.0 K/UL    ABS. LYMPHOCYTES 2.0 0.8 - 3.5 K/UL    ABS. MONOCYTES 0.7 0.0 - 1.0 K/UL    ABS.  EOSINOPHILS 0.1 0.0 - 0.4 K/UL    ABS. BASOPHILS 0.0 0.0 - 0.1 K/UL    ABS. IMM. GRANS. 0.0 0.00 - 0.04 K/UL    DF AUTOMATED     LIPID PANEL    Collection Time: 20  1:21 AM   Result Value Ref Range    LIPID PROFILE          Cholesterol, total 212 (H) <200 MG/DL    Triglyceride 204 (H) <150 MG/DL    HDL Cholesterol 49 MG/DL    LDL, calculated 122.2 (H) 0 - 100 MG/DL    VLDL, calculated 40.8 MG/DL    CHOL/HDL Ratio 4.3 0.0 - 5.0     MAGNESIUM    Collection Time: 20  1:21 AM   Result Value Ref Range    Magnesium 1.4 (L) 1.6 - 2.4 mg/dL   PHOSPHORUS    Collection Time: 20  1:21 AM   Result Value Ref Range    Phosphorus 4.3 2.6 - 4.7 MG/DL   URINALYSIS W/MICROSCOPIC    Collection Time: 20  6:45 AM   Result Value Ref Range    Color YELLOW/STRAW      Appearance CLEAR CLEAR      Specific gravity 1.019 1.003 - 1.030      pH (UA) 6.0 5.0 - 8.0      Protein NEGATIVE  NEG mg/dL    Glucose NEGATIVE  NEG mg/dL    Ketone NEGATIVE  NEG mg/dL    Bilirubin NEGATIVE  NEG      Blood NEGATIVE  NEG      Urobilinogen 0.2 0.2 - 1.0 EU/dL    Nitrites NEGATIVE  NEG      Leukocyte Esterase SMALL (A) NEG      WBC 10-20 0 - 4 /hpf    RBC 0-5 0 - 5 /hpf    Epithelial cells FEW FEW /lpf    Bacteria NEGATIVE  NEG /hpf    Hyaline cast 0-2 0 - 5 /lpf       Microbiology Data:       Blood:20 pending         Imagin/24/20  EXAM: CT orbits. No comparisons.     Thin section axial images were obtained after the intravenous administration of  100 cc of Isovue-370. From these sagittal and coronal reformats were performed. CT dose reduction was achieved through use of a standardized protocol tailored  for this examination and automatic exposure control for dose modulation.      FINDINGS: There is soft tissue swelling anterior to the left globe. There is a  curvilinear fluid collection anterior to the left globe measuring 1.6 x 0.7 x  1.5 cm.  The retro-orbital fat is normal. The ocular muscles are normal. Optic  nerve is normal. Suprasellar cistern and cavernous sinuses are unremarkable. Sinuses are patent. No fracture     IMPRESSION  IMPRESSION:  1. Soft tissue swelling anterior to the left orbit with curvilinear fluid  collection anterior to the left globe which may represent an abscess collection. There is involvement of the retro-orbital structures    Assessment / Plan:     Ms Aparna Augustine is a 62year old lad with a history of breast cancer status post mastectomy, ovarian cancer and oral chemotherapy with Olaparib admitted with orbital/facial cellulitis/abscess. 1) orbital/facial cellulitis with concern for fluid collection anterior to the left globe/abscess  Concern for extension into retro-orbital structures   Recommend assessment and drainage of any fluid collections for source control  Recommend sending cultures for anaerobic, aerobic bacterial cultures, fungal cultures as well as AFB smear and cultures. Patient states that edema of the left orbital area is improved  She is currently on vancomycin cefepime and Flagyl  If there is progression would consider adding antifungal with amphotericin B given immunosuppression with renal monitoring  Pain control per primary team  Check blood cultures given immune suppression        2) ovarian cancer  Status post systemic and intraperitoneal chemotherapy in the past  Has an indwelling port on the right chest wall  Was on Olaparib     3) Breast cancer hx   S/P Mastectomy    4)  DVT Px       Discussed plan with patient and her daughter today. Recommendations relayed to primary team earlier in the day. Thank for the opportunity to participate in the care of this patient. Please contact with questions or concerns.        Gerson Allen DO  6:06 PM

## 2020-02-25 NOTE — PROGRESS NOTES
Pharmacist Note - Vancomycin Dosing    Consult provided for this 62 y.o. female for indication of septal cellulitis . Antibiotic regimen(s): Vanc + Cefepime      Recent Labs     20  1648   WBC 8.3   CREA 0.94   BUN 23*     Frequency of BMP: daily x 3  Height: 167.6 cm  Weight: 92.8 kg  Est CrCl: 70 ml/min; UO: -- ml/kg/hr  Temp (24hrs), Av.9 °F (36.6 °C), Min:97.7 °F (36.5 °C), Max:98.1 °F (36.7 °C)    Cultures:  --    Goal trough = 10 - 15 mcg/mL    Therapy will be initiated with a loading dose of 2000 mg IV x 1(given at [facility]) to be followed by a maintenance dose of 1250 mg IV every 16 hours. Pharmacy to follow patient daily and order levels / make dose adjustments as appropriate.

## 2020-02-25 NOTE — PROGRESS NOTES
Spiritual Care Assessment/Progress Note  [facility]      NAME: Nato Tee      MRN: [id number]  AGE: 62 y.o.  SEX: female  Mormon Affiliation: Sveni   Language: English     2/25/2020     Total Time (in minutes): 23     Spiritual Assessment begun in [address] through conversation with:         [x]Patient        [x] Family    [] Friend(s)        Reason for Consult: Advance medical directive consult     Spiritual beliefs: (Please include comment if needed)     [x] Identifies with a ajith tradition:         [] Supported by a ajith community:            [] Claims no spiritual orientation:           [] Seeking spiritual identity:                [] Adheres to an individual form of spirituality:           [] Not able to assess:                           Identified resources for coping:      [x] Prayer                               [] Music                  [] Guided Imagery     [x] Family/friends                 [] Pet visits     [] Devotional reading                         [] Unknown     [] Other:                                               Interventions offered during this visit: (See comments for more details)    Patient Interventions: Advance medical directive consult, Affirmation of emotions/emotional suffering, Normalization of emotional/spiritual concerns, Guidance concerning next steps/process to be expected     Family/Friend(s): Advance medical directive consult, Affirmation of emotions/emotional suffering, Normalization of emotional/spiritual concerns, Guidance concerning next steps/process to be expected     Plan of Care:     [x] Support spiritual and/or cultural needs    [x] Support AMD and/or advance care planning process      [] Support grieving process   [] Coordinate Rites and/or Rituals    [] Coordination with community clergy   [] No spiritual needs identified at this time   [] Detailed Plan of Care below (See Comments)  [] Make referral to Music Therapy  [] Make referral to Pet Therapy     [] Make referral to Addiction services  [] Make referral to [facility]  [] Make referral to Spiritual Care Partner  [] No future visits requested        [x] Follow up visits as needed     Comments:  visit for Advance Medical Directive (AMD) consult. Pt was the bed and shared she is interested in an AMD.  explain paperwork and answered questions. Pt shared that she wanted to look over the paperwork and talk with her daughter who would be her agent before completing.  explained process of completing paperwork and let her know to have [phone] [facility] contacted if she had any questions or wanted to complete paperwork.  follow up as needed.      Marta Dumont, [facility] [place], [place]   [phone])

## 2020-02-25 NOTE — ED NOTES
Bedside shift change report given to [facility] [address] (oncoming nurse) by Brijesh Morales RN (offgoing nurse). Report included the following information SBAR, ED Summary, MAR, Recent Results and Med Rec Status.

## 2020-02-25 NOTE — ED NOTES
Dr. Sheila Kim at bedside to update patient regarding plan of care and associated time constraints. Patient verbalizes understanding and agreement.

## 2020-02-25 NOTE — ED NOTES
Report given to ALS/[facility] crew; vital signs stable upon transfer. Encounter summary/facesheet/EMTALA/PCS form provider to [facility] crew.

## 2020-02-25 NOTE — PROGRESS NOTES
Hospitalist Progress Note  Meeta Shea MD  Answering service: [phone] from in house phone      Date of Service:  2020  NAME:  Cornelio Flores  :  1955  MRN:  [id number]    Admission Summary:   64F p/w L eye area cellulitis/?abscess. On oral chemo for ovarian Ca  Interval history / Subjective:   Patient seen and examined at bedside, feels well in herself. No fever/n/v/rigors. Swelling on L orbit improved with abx since yesterday. NPO a/w ophthalmology review today     Assessment & Plan:     #. Cellulitis: with possible abscess- L orbital area. Immunocompromised with chemo.  - CT confirmed collection of fluid suspicious for abscess. Ophthalmology consulted. - IV vanc/cefepime. ID consult pending. Monitor closely. No leucocytosis, CRP wnl. #. Ovarian Ca: hold oral Chemo. F/u with oncology op  #. HLD: chronic, Stable, Home regimen    Code status: Full  DVT prophylaxis: SCD  Care Plan discussed with: Patient/Family and Nurse  Disposition: TBD     Hospital Problems  Date Reviewed: 2020          Codes Class Noted POA    * (Principal) Periorbital cellulitis of left eye ICD-10-CM: L03.213  ICD-9-CM: 682.0  2020 Yes            Review of Systems:   Pertinent items are mentioned in interval history. Vital Signs:    Last 24hrs VS reviewed since prior progress note. Most recent are:  Visit Vitals  /66   Pulse 92   Temp 97.6 °F (36.4 °C)   Resp 14   Wt 91.3 kg (201 lb 3.2 oz)   SpO2 97%   BMI 32.47 kg/m²         Intake/Output Summary (Last 24 hours) at 2020 7630  Last data filed at 2020 0400  Gross per 24 hour   Intake 100 ml   Output    Net 100 ml        Physical Examination:   General:  Alert, oriented, No acute distress.  Severe erythema on L side periOrbital area  Card:  S1, S2 without murmurs, good peripheral perfusion  Resp:  No accessory muscle use, Good AE, no wheezes, no rhonchi  Abd:  Soft, non-tender, non-distended, BS+  Extremities:  No cyanosis or clubbing, no significant edema  Neuro:  Grossly normal, no focal neuro deficits, follows commands   Psych:  Good insight, AAOx3, not agitated. Data Review:    Review and/or order of clinical lab test  Review and/or order of tests in the radiology section of [facility]  Review and/or order of tests in the medicine section of [facility]  Labs:     Recent Labs     02/25/20  0121 02/24/20  1648   WBC 6.6 8.3   HGB 12.1 12.8   HCT 37.3 40.0    229     Recent Labs     02/25/20  0121 02/24/20  1648    142   K 3.6 3.5    103   CO2 26 27   BUN 17 23*   CREA 0.96 0.94   GLU 90 93   CA 8.8 9.5   MG 1.4*  --    PHOS 4.3  --      Recent Labs     02/24/20  1648   SGOT 18   ALT 18   AP 78   TBILI 0.2   TP 7.8   ALB 4.1   GLOB 3.7     No results for input(s): INR, PTP, APTT, INREXT in the last 72 hours. No results for input(s): FE, TIBC, PSAT, FERR in the last 72 hours. No results found for: FOL, RBCF   No results for input(s): PH, PCO2, PO2 in the last 72 hours. No results for input(s): CPK, CKNDX, TROIQ in the last 72 hours.     No lab exists for component: CPKMB  Lab Results   Component Value Date/Time    Cholesterol, total 212 (H) 02/25/2020 01:21 AM    HDL Cholesterol 49 02/25/2020 01:21 AM    LDL, calculated 122.2 (H) 02/25/2020 01:21 AM    Triglyceride 204 (H) 02/25/2020 01:21 AM    CHOL/HDL Ratio 4.3 02/25/2020 01:21 AM     No results found for: GLUCPOC  No results found for: COLOR, APPRN, SPGRU, REFSG, SHAHIDA, PROTU, GLUCU, KETU, BILU, UROU, TATI, LEUKU, GLUKE, EPSU, BACTU, WBCU, RBCU, CASTS, UCRY  Medications Reviewed:     Current Facility-Administered Medications   Medication Dose Route Frequency    LORazepam (ATIVAN) tablet 0.5 mg  0.5 mg Oral DAILY PRN    pravastatin (PRAVACHOL) tablet 20 mg  20 mg Oral QHS    sodium chloride (NS) flush 5-40 mL  5-40 mL IntraVENous Q8H    sodium chloride (NS) flush 5-40 mL  5-40 mL IntraVENous PRN    ondansetron [facility]) injection 4 mg  4 mg IntraVENous Q4H PRN    bisacodyL (DULCOLAX) tablet 5 mg  5 mg Oral DAILY PRN    acetaminophen (TYLENOL) tablet 650 mg  650 mg Oral Q4H PRN    ketorolac (TORADOL) injection 15 mg  15 mg IntraVENous Q6H PRN    morphine injection 2 mg  2 mg IntraVENous Q4H PRN    vancomycin (VANCOCIN) 1250 mg in  ml infusion  1,250 mg IntraVENous Q16H    cefepime (MAXIPIME) 2 g in 0.9% sodium chloride (MBP/ADV) 100 mL  2 g IntraVENous Q12H    Vancomycin - pharmacy to dose   Other Rx Dosing/Monitoring   ______________________________________________________________________  EXPECTED LENGTH OF STAY: - - -  ACTUAL LENGTH OF STAY:          1               Balta Bardales MD

## 2020-02-25 NOTE — PROGRESS NOTES
Transition of Care Plan     Disposition: Home with family assistance once medically stable   RUR-  5%, low risk.  Transport: Patient arrived to [facility] via rescue squad from urgent care, Daughter (Stella Fischer [phone]) will provide transport at discharge. [address] Follow up with PCP/Specialist     Reason for Admission:   Patient was admitted to [facility] after presenting to ER with a swollen left eye, and drainage from the eye. Patient reports increasing pain in left eye. RUR Score:  5%, low risk. Plan for utilizing home health:  Patient denied need for home health services. Current Advanced Directive/Advance Care Plan: CM contacted pastoral care to complete AMD.                         Transition of Care Plan:      CM met with pt at the bedside, discussed role, verified demographics. Patient reported living alone, and daughter living nearby. Patient reports having no trouble with ADL's throughout the home also reported not having or needing any medical equipment in the home. Patient denied having home health aides. CM discussed completing AMD with patient during assessment, pt reported wanting to complete one while at [facility], [address] contacted pastoral care to complete AMD.    Care Management Interventions  PCP Verified by CM: Yes  Mode of Transport at Discharge: BLS(transport from urgent care)  Transition of Care Consult (CM Consult): Discharge Planning  MyChart Signup: No  Discharge Durable Medical Equipment: No  Physical Therapy Consult: No  Occupational Therapy Consult: No  Speech Therapy Consult: No  Current Support Network: Lives Alone  Confirm Follow Up Transport: Self  The Plan for Transition of Care is Related to the Following Treatment Goals : home with family assistance.   Discharge Location  Discharge Placement: Home with family assistance    Falguni Carlisle, [address] [facility], [address] [place] [address] Intern

## 2020-02-25 NOTE — ED NOTES
TRANSFER - OUT REPORT:    Verbal report given to Margret Blankenship(name) on Alvaro Ram  being transferred to 650 room 1(unit) for routine progression of care       Report consisted of patients Situation, Background, Assessment and   Recommendations(SBAR). Information from the following report(s) SBAR, ED Summary, [facility] and Recent Results was reviewed with the receiving nurse. Lines:   Peripheral IV 02/24/20 Right Hand (Active)   Site Assessment Clean, dry, & intact 2/24/2020  4:48 PM   Phlebitis Assessment 0 2/24/2020  4:48 PM   Infiltration Assessment 0 2/24/2020  4:48 PM   Dressing Status Clean, dry, & intact 2/24/2020  4:48 PM   Dressing Type Transparent;Tape 2/24/2020  4:48 PM   Hub Color/Line Status Blue;Patent; Flushed 2/24/2020  4:48 PM   Action Taken Blood drawn 2/24/2020  4:48 PM        Opportunity for questions and clarification was provided.       Patient transported with:   Tech/EMS/IV antibiotics

## 2020-02-26 LAB
ANION GAP SERPL CALC-SCNC: 3 MMOL/L (ref 5–15)
BUN SERPL-MCNC: 15 MG/DL (ref 6–20)
BUN/CREAT SERPL: 18 (ref 12–20)
CALCIUM SERPL-MCNC: 8.4 MG/DL (ref 8.5–10.1)
CHLORIDE SERPL-SCNC: 106 MMOL/L (ref 97–108)
CO2 SERPL-SCNC: 30 MMOL/L (ref 21–32)
CREAT SERPL-MCNC: 0.82 MG/DL (ref 0.55–1.02)
DATE LAST DOSE: ABNORMAL
GLUCOSE SERPL-MCNC: 102 MG/DL (ref 65–100)
POTASSIUM SERPL-SCNC: 3.6 MMOL/L (ref 3.5–5.1)
REPORTED DOSE,DOSE: ABNORMAL UNITS
REPORTED DOSE/TIME,TMG: ABNORMAL
SODIUM SERPL-SCNC: 139 MMOL/L (ref 136–145)
VANCOMYCIN TROUGH SERPL-MCNC: 10.3 UG/ML (ref 5–10)

## 2020-02-26 PROCEDURE — 80048 BASIC METABOLIC PNL TOTAL CA: CPT

## 2020-02-26 PROCEDURE — 65270000029 HC RM PRIVATE

## 2020-02-26 PROCEDURE — 74011250637 HC RX REV CODE- 250/637: Performed by: INTERNAL MEDICINE

## 2020-02-26 PROCEDURE — 80202 ASSAY OF VANCOMYCIN: CPT

## 2020-02-26 PROCEDURE — 74011250636 HC RX REV CODE- 250/636: Performed by: INTERNAL MEDICINE

## 2020-02-26 PROCEDURE — 74011000258 HC RX REV CODE- 258: Performed by: INTERNAL MEDICINE

## 2020-02-26 PROCEDURE — 36415 COLL VENOUS BLD VENIPUNCTURE: CPT

## 2020-02-26 RX ADMIN — ONDANSETRON 4 MG: 2 INJECTION INTRAMUSCULAR; INTRAVENOUS at 06:23

## 2020-02-26 RX ADMIN — ONDANSETRON 4 MG: 2 INJECTION INTRAMUSCULAR; INTRAVENOUS at 17:42

## 2020-02-26 RX ADMIN — KETOROLAC TROMETHAMINE 15 MG: 30 INJECTION, SOLUTION INTRAMUSCULAR at 13:48

## 2020-02-26 RX ADMIN — CEFEPIME HYDROCHLORIDE 2 G: 2 INJECTION, POWDER, FOR SOLUTION INTRAVENOUS at 01:17

## 2020-02-26 RX ADMIN — KETOROLAC TROMETHAMINE 15 MG: 30 INJECTION, SOLUTION INTRAMUSCULAR at 21:24

## 2020-02-26 RX ADMIN — ONDANSETRON 4 MG: 2 INJECTION INTRAMUSCULAR; INTRAVENOUS at 11:28

## 2020-02-26 RX ADMIN — CEFEPIME HYDROCHLORIDE 2 G: 2 INJECTION, POWDER, FOR SOLUTION INTRAVENOUS at 13:18

## 2020-02-26 RX ADMIN — ONDANSETRON 4 MG: 2 INJECTION INTRAMUSCULAR; INTRAVENOUS at 21:23

## 2020-02-26 RX ADMIN — LORAZEPAM 0.5 MG: 0.5 TABLET ORAL at 21:43

## 2020-02-26 RX ADMIN — Medication 1 DROP: at 09:36

## 2020-02-26 RX ADMIN — METRONIDAZOLE 500 MG: 500 INJECTION, SOLUTION INTRAVENOUS at 11:28

## 2020-02-26 RX ADMIN — Medication 10 ML: at 13:17

## 2020-02-26 RX ADMIN — Medication 10 ML: at 17:42

## 2020-02-26 RX ADMIN — VANCOMYCIN HYDROCHLORIDE 1250 MG: 10 INJECTION, POWDER, LYOPHILIZED, FOR SOLUTION INTRAVENOUS at 04:57

## 2020-02-26 RX ADMIN — METRONIDAZOLE 500 MG: 500 INJECTION, SOLUTION INTRAVENOUS at 21:30

## 2020-02-26 RX ADMIN — PRAVASTATIN SODIUM 20 MG: 20 TABLET ORAL at 21:27

## 2020-02-26 RX ADMIN — VANCOMYCIN HYDROCHLORIDE 1250 MG: 10 INJECTION, POWDER, LYOPHILIZED, FOR SOLUTION INTRAVENOUS at 22:50

## 2020-02-26 RX ADMIN — METRONIDAZOLE 500 MG: 500 INJECTION, SOLUTION INTRAVENOUS at 03:11

## 2020-02-26 RX ADMIN — Medication 10 ML: at 08:46

## 2020-02-26 RX ADMIN — Medication 10 ML: at 04:59

## 2020-02-26 RX ADMIN — Medication 10 ML: at 21:25

## 2020-02-26 NOTE — PROGRESS NOTES
Infectious Disease Progress Note        HPI:    Ms Harrell Oconee seen earlier today   Edema, erythema improving of eyelids and face   Denied nausea, vomiting diarrhea   Denied fever, chills          Physical Exam:    Vitals:   Patient Vitals for the past 24 hrs:   Temp Pulse Resp BP SpO2   20 0809 98.9 °F (37.2 °C) 98 16 137/88 93 %   20 0315 98.5 °F (36.9 °C) 84 16 150/90 94 %   20 2015 99 °F (37.2 °C) 84 16 123/80 96 %   20 1529 99.1 °F (37.3 °C) 90 15 117/75 96 %   20 1352 98.1 °F (36.7 °C) 92 14 135/81 94 %     · GEN: NAD  · HEENT edema improved of eye lids, erythema noted still but improving, + CV: S1, S2 heard regularly, conjunctival erythema ,   · Lungs: Clear to auscultation bilaterally  · Abdomen: soft, non distended, non tender,   · Extremities: no edema  · Skin: no rash        Labs:   Recent Results (from the past 24 hour(s))   CULTURE, BLOOD, PAIRED    Collection Time: 20  2:48 PM   Result Value Ref Range    Special Requests: NO SPECIAL REQUESTS      Culture result: NO GROWTH AFTER 14 HOURS     METABOLIC PANEL, BASIC    Collection Time: 20  3:19 AM   Result Value Ref Range    Sodium 139 136 - 145 mmol/L    Potassium 3.6 3.5 - 5.1 mmol/L    Chloride 106 97 - 108 mmol/L    CO2 30 21 - 32 mmol/L    Anion gap 3 (L) 5 - 15 mmol/L    Glucose 102 (H) 65 - 100 mg/dL    BUN 15 6 - 20 MG/DL    Creatinine 0.82 0.55 - 1.02 MG/DL    BUN/Creatinine ratio 18 12 - 20      GFR est AA >60 >60 ml/min/1.73m2    GFR est non-AA >60 >60 ml/min/1.73m2    Calcium 8.4 (L) 8.5 - 10.1 MG/DL       Microbiology Data:       Blood:20  6:07 AM - [facility], Lab In [facility]     Specimen Information: Blood        Component Value Ref Range & Units Status   Special Requests: NO SPECIAL REQUESTS    Preliminary   Culture result: NO GROWTH AFTER 14 HOURS    Preliminary   Result History             Imagin/24/20  EXAM: CT orbits.   No comparisons.     Thin section axial images were obtained after the intravenous administration of  100 cc of Isovue-370. From these sagittal and coronal reformats were performed. CT dose reduction was achieved through use of a standardized protocol tailored  for this examination and automatic exposure control for dose modulation.      FINDINGS: There is soft tissue swelling anterior to the left globe. There is a  curvilinear fluid collection anterior to the left globe measuring 1.6 x 0.7 x  1.5 cm. The retro-orbital fat is normal. The ocular muscles are normal. Optic  nerve is normal. Suprasellar cistern and cavernous sinuses are unremarkable. Sinuses are patent. No fracture     IMPRESSION  IMPRESSION:  1. Soft tissue swelling anterior to the left orbit with curvilinear fluid  collection anterior to the left globe which may represent an abscess collection. There is involvement of the retro-orbital structures    Assessment / Plan:     Ms Tip Tierney is a 62year old lad with a history of breast cancer status post mastectomy, ovarian cancer and oral chemotherapy with Olaparib admitted with orbital/facial cellulitis/abscess. 1) orbital/facial cellulitis with concern for fluid collection anterior to the left globe/abscess on initial imaging  Clinically improving on antibiotics  Seen by ophthalmology   Continue current antibiotics  If worse, repeat imaging to assess for fluid collections, abscess  Pending blood cultures           2) ovarian cancer  Status post systemic and intraperitoneal chemotherapy in the past  Has an indwelling port on the right chest wall  Was on Olaparib     3) Breast cancer hx   S/P Mastectomy    4)  DVT Px               Thank for the opportunity to participate in the care of this patient. Please contact with questions or concerns.        Lefty Mckeon DO  1:37 PM

## 2020-02-26 NOTE — PROGRESS NOTES
Hospitalist Progress Note  Aliza Back MD  Answering service: [phone] from in house phone      Date of Service:  2020  NAME:  Tucker Mckeon  :  1955  MRN:  [id number]    Admission Summary:   64F p/w L eye area cellulitis/?abscess. On oral chemo for ovarian Ca  Interval history / Subjective:   Patient seen and examined at bedside, feels well, no systemic symptoms. No fever/n/v. Daughter visiting. Not much pain. L eye erythema improving, less red, less swelling. Full ROM of both eyes, no double vision     Assessment & Plan:     #. Cellulitis: with possible abscess- L orbital area. Immunocompromised with chemo.  - CT confirmed collection of fluid suspicious for abscess. Ophthalmology evaluated, no surg now. - IV vanc/cefepime/ flagyl. ID following. Monitor closely. No leucocytosis, CRP wnl. #. Ovarian Ca: hold oral Chemo. F/u with oncology op  #. HLD: chronic, Stable, Home regimen    Code status: Full  DVT prophylaxis: SCD  Care Plan discussed with: Patient/Family and Nurse  Disposition: TBD     Hospital Problems  Date Reviewed: 2020          Codes Class Noted POA    * (Principal) Periorbital cellulitis of left eye ICD-10-CM: L03.213  ICD-9-CM: 682.0  2020 Yes            Review of Systems:   Pertinent items are mentioned in interval history. Vital Signs:    Last 24hrs VS reviewed since prior progress note. Most recent are:  Visit Vitals  /88 (BP 1 Location: Left arm, BP Patient Position: At rest)   Pulse 98   Temp 98.9 °F (37.2 °C)   Resp 16   Wt 91.3 kg (201 lb 3.2 oz)   SpO2 93%   BMI 32.47 kg/m²       No intake or output data in the 24 hours ending 20 1201     Physical Examination:   General:  Alert, oriented, No acute distress.  Severe erythema on L side periOrbital area- improving  Resp:  No accessory muscle use, Good AE, no wheezes, no rhonchi  Abd:  Soft, non-tender, non-distended,  Extremities:  No cyanosis or clubbing, no significant edema  Neuro:  Grossly normal, no focal neuro deficits,Full ROM of both eyes, no double vision follows commands   Psych:  Good insight, AAOx3, not agitated. Data Review:    Review and/or order of clinical lab test  Review and/or order of tests in the radiology section of [facility]  Review and/or order of tests in the medicine section of [facility]  Labs:     Recent Labs     02/25/20  0121 02/24/20  1648   WBC 6.6 8.3   HGB 12.1 12.8   HCT 37.3 40.0    229     Recent Labs     02/26/20  0319 02/25/20  0121 02/24/20  1648    138 142   K 3.6 3.6 3.5    105 103   CO2 30 26 27   BUN 15 17 23*   CREA 0.82 0.96 0.94   * 90 93   CA 8.4* 8.8 9.5   MG  --  1.4*  --    PHOS  --  4.3  --      Recent Labs     02/24/20  1648   SGOT 18   ALT 18   AP 78   TBILI 0.2   TP 7.8   ALB 4.1   GLOB 3.7     No results for input(s): INR, PTP, APTT, INREXT, INREXT in the last 72 hours. No results for input(s): FE, TIBC, PSAT, FERR in the last 72 hours. No results found for: FOL, RBCF   No results for input(s): PH, PCO2, PO2 in the last 72 hours. No results for input(s): CPK, CKNDX, TROIQ in the last 72 hours.     No lab exists for component: CPKMB  Lab Results   Component Value Date/Time    Cholesterol, total 212 (H) 02/25/2020 01:21 AM    HDL Cholesterol 49 02/25/2020 01:21 AM    LDL, calculated 122.2 (H) 02/25/2020 01:21 AM    Triglyceride 204 (H) 02/25/2020 01:21 AM    CHOL/HDL Ratio 4.3 02/25/2020 01:21 AM     No results found for: [facility]  Lab Results   Component Value Date/Time    Color YELLOW/STRAW 02/25/2020 06:45 AM    Appearance CLEAR 02/25/2020 06:45 AM    Specific gravity 1.019 02/25/2020 06:45 AM    pH (UA) 6.0 02/25/2020 06:45 AM    Protein NEGATIVE  02/25/2020 06:45 AM    Glucose NEGATIVE  02/25/2020 06:45 AM    Ketone NEGATIVE  02/25/2020 06:45 AM    Bilirubin NEGATIVE  02/25/2020 06:45 AM    Urobilinogen 0.2 02/25/2020 06:45 AM Nitrites NEGATIVE  02/25/2020 06:45 AM    Leukocyte Esterase SMALL (A) 02/25/2020 06:45 AM    Epithelial cells FEW 02/25/2020 06:45 AM    Bacteria NEGATIVE  02/25/2020 06:45 AM    WBC 10-20 02/25/2020 06:45 AM    RBC 0-5 02/25/2020 06:45 AM     Medications Reviewed:     Current Facility-Administered Medications   Medication Dose Route Frequency    polyvinyl alcohol-povidone (NATURAL TEARS) 0.5-0.6 % ophthalmic solution 1 Drop  1 Drop Both Eyes PRN    Vanc trough due today 2/26 @ 2100.    Other ONCE    LORazepam (ATIVAN) tablet 0.5 mg  0.5 mg Oral DAILY PRN    pravastatin (PRAVACHOL) tablet 20 mg  20 mg Oral QHS    sodium chloride (NS) flush 5-40 mL  5-40 mL IntraVENous Q8H    sodium chloride (NS) flush 5-40 mL  5-40 mL IntraVENous PRN    ondansetron (ZOFRAN) injection 4 mg  4 mg IntraVENous Q4H PRN    bisacodyL (DULCOLAX) tablet 5 mg  5 mg Oral DAILY PRN    metroNIDAZOLE (FLAGYL) IVPB premix 500 mg  500 mg IntraVENous Q8H    acetaminophen (TYLENOL) tablet 650 mg  650 mg Oral Q4H PRN    ketorolac (TORADOL) injection 15 mg  15 mg IntraVENous Q6H PRN    morphine injection 2 mg  2 mg IntraVENous Q4H PRN    vancomycin (VANCOCIN) 1250 mg in  ml infusion  1,250 mg IntraVENous Q16H    cefepime (MAXIPIME) 2 g in 0.9% sodium chloride (MBP/ADV) 100 mL  2 g IntraVENous Q12H    Vancomycin - pharmacy to dose   Other Rx Dosing/Monitoring   ______________________________________________________________________  EXPECTED LENGTH OF STAY: 3d 7h  ACTUAL LENGTH OF STAY:          2               Rand Pastor MD

## 2020-02-27 PROCEDURE — 74011000250 HC RX REV CODE- 250: Performed by: INTERNAL MEDICINE

## 2020-02-27 PROCEDURE — 74011250636 HC RX REV CODE- 250/636: Performed by: INTERNAL MEDICINE

## 2020-02-27 PROCEDURE — 74011250637 HC RX REV CODE- 250/637: Performed by: INTERNAL MEDICINE

## 2020-02-27 PROCEDURE — 65270000029 HC RM PRIVATE

## 2020-02-27 PROCEDURE — 94760 N-INVAS EAR/PLS OXIMETRY 1: CPT

## 2020-02-27 PROCEDURE — 74011000258 HC RX REV CODE- 258: Performed by: INTERNAL MEDICINE

## 2020-02-27 RX ORDER — KETOTIFEN FUMARATE 0.35 MG/ML
1 SOLUTION/ DROPS OPHTHALMIC 2 TIMES DAILY
Status: DISCONTINUED | OUTPATIENT
Start: 2020-02-27 | End: 2020-02-28 | Stop reason: HOSPADM

## 2020-02-27 RX ADMIN — VANCOMYCIN HYDROCHLORIDE 1250 MG: 10 INJECTION, POWDER, LYOPHILIZED, FOR SOLUTION INTRAVENOUS at 16:23

## 2020-02-27 RX ADMIN — KETOROLAC TROMETHAMINE 15 MG: 30 INJECTION, SOLUTION INTRAMUSCULAR at 09:50

## 2020-02-27 RX ADMIN — ONDANSETRON 4 MG: 2 INJECTION INTRAMUSCULAR; INTRAVENOUS at 06:08

## 2020-02-27 RX ADMIN — Medication 1 CAPSULE: at 19:09

## 2020-02-27 RX ADMIN — PRAVASTATIN SODIUM 20 MG: 20 TABLET ORAL at 21:20

## 2020-02-27 RX ADMIN — ONDANSETRON 4 MG: 2 INJECTION INTRAMUSCULAR; INTRAVENOUS at 22:11

## 2020-02-27 RX ADMIN — LORAZEPAM 0.5 MG: 0.5 TABLET ORAL at 21:20

## 2020-02-27 RX ADMIN — METRONIDAZOLE 500 MG: 500 INJECTION, SOLUTION INTRAVENOUS at 06:08

## 2020-02-27 RX ADMIN — KETOTIFEN FUMARATE 1 DROP: 0.35 SOLUTION/ DROPS OPHTHALMIC at 19:39

## 2020-02-27 RX ADMIN — METRONIDAZOLE 500 MG: 500 INJECTION, SOLUTION INTRAVENOUS at 14:48

## 2020-02-27 RX ADMIN — CEFEPIME HYDROCHLORIDE 2 G: 2 INJECTION, POWDER, FOR SOLUTION INTRAVENOUS at 13:33

## 2020-02-27 RX ADMIN — CEFEPIME HYDROCHLORIDE 2 G: 2 INJECTION, POWDER, FOR SOLUTION INTRAVENOUS at 01:20

## 2020-02-27 RX ADMIN — Medication 10 ML: at 21:21

## 2020-02-27 RX ADMIN — ONDANSETRON 4 MG: 2 INJECTION INTRAMUSCULAR; INTRAVENOUS at 18:14

## 2020-02-27 RX ADMIN — Medication 10 ML: at 16:17

## 2020-02-27 RX ADMIN — CEFEPIME HYDROCHLORIDE 2 G: 2 INJECTION, POWDER, FOR SOLUTION INTRAVENOUS at 23:53

## 2020-02-27 RX ADMIN — ONDANSETRON 4 MG: 2 INJECTION INTRAMUSCULAR; INTRAVENOUS at 13:32

## 2020-02-27 RX ADMIN — KETOROLAC TROMETHAMINE 15 MG: 30 INJECTION, SOLUTION INTRAMUSCULAR at 16:24

## 2020-02-27 RX ADMIN — METRONIDAZOLE 500 MG: 500 INJECTION, SOLUTION INTRAVENOUS at 21:21

## 2020-02-27 NOTE — PROGRESS NOTES
Pharmacist Note - Vancomycin Dosing  Therapy day 3  Indication: septal cellulitis - L orbital area. Immunocompromised with chemo  Current regimen:  1250 mg IV q16h    A Trough Level resulted at 10.3 mcg/mL which was obtained 16 hrs post-dose. Goal trough: 10 - 15 mcg/mL     Plan: Continue current regimen. (Clinically improving)  Pharmacy will continue to monitor this patient daily for changes in clinical status and renal function.

## 2020-02-27 NOTE — PROGRESS NOTES
Infectious Disease Progress Note        HPI:    Ms Braun Shows seen earlier today   Edema, erythema improving of eyelids and face   Denied nausea, vomiting diarrhea   Denied fever, chills          Physical Exam:    Vitals:   Patient Vitals for the past 24 hrs:   Temp Pulse Resp BP SpO2   20 0859 97.4 °F (36.3 °C) 85 18 137/80 95 %   20 0201 97.4 °F (36.3 °C) 76 18 118/77 95 %   20 2018 98.3 °F (36.8 °C) 84 18 113/75 94 %     · GEN: NAD  · HEENT edema improved of eye lids, erythema improving but still present around L eye lid  ·  CV: S1, S2 heard regularly, conjunctival erythema ,   · Lungs: Clear to auscultation bilaterally  · Abdomen: soft, non distended, non tender,   · Extremities: no edema  · Skin: no rash        Labs:   Recent Results (from the past 24 hour(s))   VANCOMYCIN, TROUGH    Collection Time: 20  9:31 PM   Result Value Ref Range    Vancomycin,trough 10.3 (H) 5.0 - 10.0 ug/mL    Reported dose date: NOT PROVIDED      Reported dose time: NOT PROVIDED      Reported dose: NOT PROVIDED UNITS       Microbiology Data:       Blood:20  6:07 AM - [place], Lab In [facility]     Specimen Information: Blood        Component Value Ref Range & Units Status   Special Requests: NO SPECIAL REQUESTS    Preliminary   Culture result: NO GROWTH AFTER 14 HOURS    Preliminary   Result History             Imagin/24/20  EXAM: CT orbits. No comparisons.     Thin section axial images were obtained after the intravenous administration of  100 cc of Isovue-370. From these sagittal and coronal reformats were performed. CT dose reduction was achieved through use of a standardized protocol tailored  for this examination and automatic exposure control for dose modulation.      FINDINGS: There is soft tissue swelling anterior to the left globe. There is a  curvilinear fluid collection anterior to the left globe measuring 1.6 x 0.7 x  1.5 cm.  The retro-orbital fat is normal. The ocular muscles are normal. Optic  nerve is normal. Suprasellar cistern and cavernous sinuses are unremarkable. Sinuses are patent. No fracture     IMPRESSION  IMPRESSION:  1. Soft tissue swelling anterior to the left orbit with curvilinear fluid  collection anterior to the left globe which may represent an abscess collection. There is involvement of the retro-orbital structures    Assessment / Plan:     Ms Katina Ayon is a 62year old lad with a history of breast cancer status post mastectomy, ovarian cancer and oral chemotherapy with Olaparib admitted with orbital/facial cellulitis/abscess. 1) orbital/facial cellulitis with concern for fluid collection anterior to the left globe/abscess on initial imaging  Clinically improving on antibiotics Vancomycin, Cefepime and Flagyl   Seen by ophthalmology   Continue current antibiotics today  If continues plan to switch to oral tomorrow possibly   If worse, repeat imaging to assess for fluid collections, abscess  Blood cx negative so far           2) ovarian cancer  Status post systemic and intraperitoneal chemotherapy in the past  Has an indwelling port on the right chest wall  Was on Olaparib     3) Breast cancer hx   S/P Mastectomy    4)  DVT Px               Thank for the opportunity to participate in the care of this patient. Please contact with questions or concerns.        Adriana Galaviz DO  11:48 AM

## 2020-02-27 NOTE — PROGRESS NOTES
Problem: Pain  Goal: *Control of Pain  Outcome: Progressing Towards Goal     Problem: Falls - Risk of  Goal: *Absence of Falls  Description  Document Jim Mohamud Fall Risk and appropriate interventions in the flowsheet.   Outcome: Progressing Towards Goal  Note: Fall Risk Interventions:            Medication Interventions: Evaluate medications/consider consulting pharmacy         History of Falls Interventions: Evaluate medications/consider consulting pharmacy

## 2020-02-28 ENCOUNTER — DOCUMENTATION ONLY (OUTPATIENT)
Dept: FAMILY MEDICINE CLINIC | Age: 65
End: 2020-02-28

## 2020-02-28 VITALS
WEIGHT: 201.2 LBS | HEART RATE: 90 BPM | SYSTOLIC BLOOD PRESSURE: 142 MMHG | DIASTOLIC BLOOD PRESSURE: 84 MMHG | OXYGEN SATURATION: 99 % | RESPIRATION RATE: 16 BRPM | TEMPERATURE: 98.6 F | BODY MASS INDEX: 32.47 KG/M2

## 2020-02-28 LAB
ALBUMIN SERPL-MCNC: 2.7 G/DL (ref 3.5–5)
ALBUMIN/GLOB SERPL: 0.8 {RATIO} (ref 1.1–2.2)
ALP SERPL-CCNC: 76 U/L (ref 45–117)
ALT SERPL-CCNC: 30 U/L (ref 12–78)
ANION GAP SERPL CALC-SCNC: 6 MMOL/L (ref 5–15)
AST SERPL-CCNC: 22 U/L (ref 15–37)
ATRIAL RATE: 76 BPM
BILIRUB SERPL-MCNC: 0.2 MG/DL (ref 0.2–1)
BUN SERPL-MCNC: 25 MG/DL (ref 6–20)
BUN/CREAT SERPL: 31 (ref 12–20)
CALCIUM SERPL-MCNC: 8.1 MG/DL (ref 8.5–10.1)
CALCULATED P AXIS, ECG09: 63 DEGREES
CALCULATED R AXIS, ECG10: 29 DEGREES
CALCULATED T AXIS, ECG11: 46 DEGREES
CHLORIDE SERPL-SCNC: 110 MMOL/L (ref 97–108)
CO2 SERPL-SCNC: 24 MMOL/L (ref 21–32)
CREAT SERPL-MCNC: 0.81 MG/DL (ref 0.55–1.02)
DIAGNOSIS, 93000: NORMAL
ERYTHROCYTE [DISTWIDTH] IN BLOOD BY AUTOMATED COUNT: 13.1 % (ref 11.5–14.5)
GLOBULIN SER CALC-MCNC: 3.3 G/DL (ref 2–4)
GLUCOSE SERPL-MCNC: 109 MG/DL (ref 65–100)
HCT VFR BLD AUTO: 34.6 % (ref 35–47)
HGB BLD-MCNC: 11.1 G/DL (ref 11.5–16)
MAGNESIUM SERPL-MCNC: 1.4 MG/DL (ref 1.6–2.4)
MCH RBC QN AUTO: 29.4 PG (ref 26–34)
MCHC RBC AUTO-ENTMCNC: 32.1 G/DL (ref 30–36.5)
MCV RBC AUTO: 91.5 FL (ref 80–99)
NRBC # BLD: 0 K/UL (ref 0–0.01)
NRBC BLD-RTO: 0 PER 100 WBC
P-R INTERVAL, ECG05: 174 MS
PLATELET # BLD AUTO: 188 K/UL (ref 150–400)
PMV BLD AUTO: 9.4 FL (ref 8.9–12.9)
POTASSIUM SERPL-SCNC: 3.8 MMOL/L (ref 3.5–5.1)
PROT SERPL-MCNC: 6 G/DL (ref 6.4–8.2)
Q-T INTERVAL, ECG07: 366 MS
QRS DURATION, ECG06: 78 MS
QTC CALCULATION (BEZET), ECG08: 411 MS
RBC # BLD AUTO: 3.78 M/UL (ref 3.8–5.2)
SODIUM SERPL-SCNC: 140 MMOL/L (ref 136–145)
VENTRICULAR RATE, ECG03: 76 BPM
WBC # BLD AUTO: 4.4 K/UL (ref 3.6–11)

## 2020-02-28 PROCEDURE — 93005 ELECTROCARDIOGRAM TRACING: CPT

## 2020-02-28 PROCEDURE — 80053 COMPREHEN METABOLIC PANEL: CPT

## 2020-02-28 PROCEDURE — 74011250636 HC RX REV CODE- 250/636: Performed by: INTERNAL MEDICINE

## 2020-02-28 PROCEDURE — 94760 N-INVAS EAR/PLS OXIMETRY 1: CPT

## 2020-02-28 PROCEDURE — 83735 ASSAY OF MAGNESIUM: CPT

## 2020-02-28 PROCEDURE — 36415 COLL VENOUS BLD VENIPUNCTURE: CPT

## 2020-02-28 PROCEDURE — 74011250637 HC RX REV CODE- 250/637: Performed by: INTERNAL MEDICINE

## 2020-02-28 PROCEDURE — 85027 COMPLETE CBC AUTOMATED: CPT

## 2020-02-28 RX ORDER — LEVOFLOXACIN 750 MG/1
750 TABLET ORAL DAILY
Qty: 7 TAB | Refills: 0 | Status: SHIPPED | OUTPATIENT
Start: 2020-02-28

## 2020-02-28 RX ORDER — LINEZOLID 600 MG/1
600 TABLET, FILM COATED ORAL 2 TIMES DAILY
Qty: 14 TAB | Refills: 0 | Status: SHIPPED | OUTPATIENT
Start: 2020-02-28

## 2020-02-28 RX ORDER — KETOTIFEN FUMARATE 0.35 MG/ML
1 SOLUTION/ DROPS OPHTHALMIC 2 TIMES DAILY
Qty: 1 BOTTLE | Refills: 0 | Status: SHIPPED | OUTPATIENT
Start: 2020-02-28 | End: 2020-03-09

## 2020-02-28 RX ADMIN — Medication 10 ML: at 10:16

## 2020-02-28 RX ADMIN — METRONIDAZOLE 500 MG: 500 INJECTION, SOLUTION INTRAVENOUS at 05:15

## 2020-02-28 RX ADMIN — KETOTIFEN FUMARATE 1 DROP: 0.35 SOLUTION/ DROPS OPHTHALMIC at 09:02

## 2020-02-28 RX ADMIN — Medication 10 ML: at 05:15

## 2020-02-28 RX ADMIN — VANCOMYCIN HYDROCHLORIDE 1250 MG: 10 INJECTION, POWDER, LYOPHILIZED, FOR SOLUTION INTRAVENOUS at 06:52

## 2020-02-28 RX ADMIN — ONDANSETRON 4 MG: 2 INJECTION INTRAMUSCULAR; INTRAVENOUS at 05:15

## 2020-02-28 RX ADMIN — ONDANSETRON 4 MG: 2 INJECTION INTRAMUSCULAR; INTRAVENOUS at 10:16

## 2020-02-28 RX ADMIN — Medication 1 CAPSULE: at 09:05

## 2020-02-28 NOTE — PROGRESS NOTES
Infectious Disease Progress Note    Note being placed late  Patient seen before DC today am      HPI:    Ms Demarcus Lopez seen earlier today   Edema, erythema improving of eyelids and face markedly improved   Denied nausea, vomiting diarrhea   Denied fever, chills          Physical Exam:    Vitals:   Patient Vitals for the past 24 hrs:   Temp Pulse Resp BP SpO2   02/27/20 0859 97.4 °F (36.3 °C) 85 18 137/80 95 %   02/27/20 0201 97.4 °F (36.3 °C) 76 18 118/77 95 %   02/26/20 2018 98.3 °F (36.8 °C) 84 18 113/75 94 %     · GEN: NAD  · HEENT edema improved of eye lids, erythema almost resolved  ·  CV: S1, S2 heard regularly, conjunctival erythema ,   · Lungs: Clear to auscultation bilaterally  · Abdomen: soft, non distended, non tender,   · Extremities: no edema  · Skin: no rash        Labs:   Recent Results (from the past 24 hour(s))   CBC W/O DIFF    Collection Time: 02/28/20  3:17 AM   Result Value Ref Range    WBC 4.4 3.6 - 11.0 K/uL    RBC 3.78 (L) 3.80 - 5.20 M/uL    HGB 11.1 (L) 11.5 - 16.0 g/dL    HCT 34.6 (L) 35.0 - 47.0 %    MCV 91.5 80.0 - 99.0 FL    MCH 29.4 26.0 - 34.0 PG    MCHC 32.1 30.0 - 36.5 g/dL    RDW 13.1 11.5 - 14.5 %    PLATELET 474 384 - 053 K/uL    MPV 9.4 8.9 - 12.9 FL    NRBC 0.0 0  WBC    ABSOLUTE NRBC 0.00 0.00 - 5.89 K/uL   METABOLIC PANEL, COMPREHENSIVE    Collection Time: 02/28/20  3:17 AM   Result Value Ref Range    Sodium 140 136 - 145 mmol/L    Potassium 3.8 3.5 - 5.1 mmol/L    Chloride 110 (H) 97 - 108 mmol/L    CO2 24 21 - 32 mmol/L    Anion gap 6 5 - 15 mmol/L    Glucose 109 (H) 65 - 100 mg/dL    BUN 25 (H) 6 - 20 MG/DL    Creatinine 0.81 0.55 - 1.02 MG/DL    BUN/Creatinine ratio 31 (H) 12 - 20      GFR est AA >60 >60 ml/min/1.73m2    GFR est non-AA >60 >60 ml/min/1.73m2    Calcium 8.1 (L) 8.5 - 10.1 MG/DL    Bilirubin, total 0.2 0.2 - 1.0 MG/DL    ALT (SGPT) 30 12 - 78 U/L    AST (SGOT) 22 15 - 37 U/L    Alk.  phosphatase 76 45 - 117 U/L    Protein, total 6.0 (L) 6.4 - 8.2 g/dL Albumin 2.7 (L) 3.5 - 5.0 g/dL    Globulin 3.3 2.0 - 4.0 g/dL    A-G Ratio 0.8 (L) 1.1 - 2.2     MAGNESIUM    Collection Time: 20  3:17 AM   Result Value Ref Range    Magnesium 1.4 (L) 1.6 - 2.4 mg/dL   EKG, 12 LEAD, INITIAL    Collection Time: 20 10:42 AM   Result Value Ref Range    Ventricular Rate 76 BPM    Atrial Rate 76 BPM    P-R Interval 174 ms    QRS Duration 78 ms    Q-T Interval 366 ms    QTC Calculation (Bezet) 411 ms    Calculated P Axis 63 degrees    Calculated R Axis 29 degrees    Calculated T Axis 46 degrees    Diagnosis       Normal sinus rhythm  Normal ECG  No previous ECGs available         Microbiology Data:       Blood:20  6:07 AM - [place], [facility] In [facility]     Specimen Information: Blood        Component Value Ref Range & Units Status   Special Requests: NO SPECIAL REQUESTS    Preliminary   Culture result: NO GROWTH AFTER 14 HOURS    Preliminary   Result History             Imagin/24/20  EXAM: CT orbits. No comparisons.     Thin section axial images were obtained after the intravenous administration of  100 cc of Isovue-370. From these sagittal and coronal reformats were performed. CT dose reduction was achieved through use of a standardized protocol tailored  for this examination and automatic exposure control for dose modulation.      FINDINGS: There is soft tissue swelling anterior to the left globe. There is a  curvilinear fluid collection anterior to the left globe measuring 1.6 x 0.7 x  1.5 cm. The retro-orbital fat is normal. The ocular muscles are normal. Optic  nerve is normal. Suprasellar cistern and cavernous sinuses are unremarkable. Sinuses are patent. No fracture     IMPRESSION  IMPRESSION:  1. Soft tissue swelling anterior to the left orbit with curvilinear fluid  collection anterior to the left globe which may represent an abscess collection.   There is involvement of the retro-orbital structures    Assessment / Plan:     Ms Arch Homans is a 62 year old lad with a history of breast cancer status post mastectomy, ovarian cancer and oral chemotherapy with Olaparib admitted with orbital/facial cellulitis/abscess. 1) orbital/facial cellulitis with concern for fluid collection anterior to the left globe/abscess on initial imaging  Clinically improved on antibiotics Vancomycin, Cefepime and Flagyl   Seen by ophthalmology   ekg with normal QTC  Will plan for Zyvox 600 mg bid  + Levaquin 750 mg daily for a week  F/U with me on 3/5 at 1.45 pm  Side effects of antibiotic including tendinopathy, GI disturbances, nausea vomiting diarrhea discussed   If any worsening, needs to go to the ER for IV therapy , discs used   Monitor for rash, allergy symptoms           2) ovarian cancer  Status post systemic and intraperitoneal chemotherapy in the past  Has an indwelling port on the right chest wall  Was on Olaparib     3) Breast cancer hx   S/P Mastectomy    4)  DVT Px               Thank for the opportunity to participate in the care of this patient. Please contact with questions or concerns.        Luci Darby,   1:33 PM

## 2020-02-28 NOTE — PROGRESS NOTES
Hospital follow-up PCP transitional care appointment has been scheduled with Dr. Tiana Leigh for Monday, 3/9/20 at 9:45 a.m. Pending patient discharge.   Shanell Keen, Care Management Specialist.

## 2020-02-28 NOTE — PROGRESS NOTES
Hospitalist Progress Note  Paul Joel MD  Answering service: [phone] from in house phone      Date of Service:  2020  NAME:  Jase Christianson  :  1955  MRN:  [id number]    Admission Summary:   64F p/w L eye area cellulitis/?abscess. On oral chemo for ovarian Ca  Interval history / Subjective:   Patient seen and examined at bedside, feels well, no systemic symptoms. No fever/n/v. Has significant eye itching. Artificial tears helping some. Not much pain. L eye erythema improving, less red, less swelling. Full ROM of both eyes, no double vision. No other concerns. No overnight events. D/w patient and nurse on the floor. Assessment & Plan:     #. Left periorbital Cellulitis: with possible abscess- L orbital area. Immunocompromised with chemo.  - CT confirmed collection of fluid suspicious for abscess. Ophthalmology evaluated, no surg now. - IV vanc/cefepime/ flagyl. ID following. Monitor closely. No leucocytosis, CRP wnl.  - If continue to improve, likely transition to PO Abx per ID. Disposition once clear from ID with outpatient f/up. # Eye itching  -Add Zatidor gtt bid in left eye prn for itching  -Continue artificial tears    #. Ovarian Ca: hold oral Chemo. F/u with oncology op. #. HLD: chronic, Stable, Home regimen    Code status: Full  DVT prophylaxis: SCD  Care Plan discussed with: Patient/Family and Nurse  Disposition: 24-48hrs, home likely     Hospital Problems  Date Reviewed: 2020          Codes Class Noted POA    * (Principal) Periorbital cellulitis of left eye ICD-10-CM: L03.213  ICD-9-CM: 682.0  2020 Yes            Review of Systems:   Pertinent items are mentioned in interval history. Vital Signs:    Last 24hrs VS reviewed since prior progress note.  Most recent are:  Visit Vitals  /74 (BP 1 Location: Right arm, BP Patient Position: At rest)   Pulse 75   Temp 98.8 °F (37.1 °C)   Resp 17 Wt 91.3 kg (201 lb 3.2 oz)   SpO2 95%   BMI 32.47 kg/m²       No intake or output data in the 24 hours ending 02/27/20 2150     Physical Examination:   General:  Alert, oriented, No acute distress. Severe erythema on L side periOrbital area- improving  Resp:  No accessory muscle use, Good AE, no wheezes, no rhonchi  Abd:  Soft, non-tender, non-distended,  Extremities:  No cyanosis or clubbing, no significant edema  Neuro:  Grossly normal, no focal neuro deficits,Full ROM of both eyes, no double vision follows commands   Psych:  Good insight, AAOx3, not agitated. Data Review:    Review and/or order of clinical lab test  Review and/or order of tests in the radiology section of CPT  Review and/or order of tests in the medicine section of CPT    Ct Orbit Post Fossa W Cont    Addendum Date: 2/24/2020    Addendum: Addendum: There is a typographical error in the impression. There is no involvement of the retro-orbital structures    Result Date: 2/24/2020  IMPRESSION: 1. Soft tissue swelling anterior to the left orbit with curvilinear fluid collection anterior to the left globe which may represent an abscess collection. There is involvement of the retro-orbital structures      Labs:     Recent Labs     02/25/20  0121   WBC 6.6   HGB 12.1   HCT 37.3        Recent Labs     02/26/20  0319 02/25/20  0121    138   K 3.6 3.6    105   CO2 30 26   BUN 15 17   CREA 0.82 0.96   * 90   CA 8.4* 8.8   MG  --  1.4*   PHOS  --  4.3     No results for input(s): SGOT, GPT, ALT, AP, TBIL, TBILI, TP, ALB, GLOB, GGT, AML, LPSE in the last 72 hours. No lab exists for component: AMYP, HLPSE  No results for input(s): INR, PTP, APTT, INREXT, INREXT in the last 72 hours. No results for input(s): FE, TIBC, PSAT, FERR in the last 72 hours. No results found for: FOL, RBCF   No results for input(s): PH, PCO2, PO2 in the last 72 hours. No results for input(s): CPK, CKNDX, TROIQ in the last 72 hours.     No lab exists for component: CPKMB  Lab Results   Component Value Date/Time    Cholesterol, total 212 (H) 02/25/2020 01:21 AM    HDL Cholesterol 49 02/25/2020 01:21 AM    LDL, calculated 122.2 (H) 02/25/2020 01:21 AM    Triglyceride 204 (H) 02/25/2020 01:21 AM    CHOL/HDL Ratio 4.3 02/25/2020 01:21 AM     No results found for: [facility]  Lab Results   Component Value Date/Time    Color YELLOW/STRAW 02/25/2020 06:45 AM    Appearance CLEAR 02/25/2020 06:45 AM    Specific gravity 1.019 02/25/2020 06:45 AM    pH (UA) 6.0 02/25/2020 06:45 AM    Protein NEGATIVE  02/25/2020 06:45 AM    Glucose NEGATIVE  02/25/2020 06:45 AM    Ketone NEGATIVE  02/25/2020 06:45 AM    Bilirubin NEGATIVE  02/25/2020 06:45 AM    Urobilinogen 0.2 02/25/2020 06:45 AM    Nitrites NEGATIVE  02/25/2020 06:45 AM    Leukocyte Esterase SMALL (A) 02/25/2020 06:45 AM    Epithelial cells FEW 02/25/2020 06:45 AM    Bacteria NEGATIVE  02/25/2020 06:45 AM    WBC 10-20 02/25/2020 06:45 AM    RBC 0-5 02/25/2020 06:45 AM     Medications Reviewed:     Current Facility-Administered Medications   Medication Dose Route Frequency    lactobac ac& pc-s.therm-b.anim (MAZIN Q/RISAQUAD)  1 Cap Oral DAILY    ketotifen (ZADITOR) 0.025 % (0.035 %) ophthalmic solution 1 Drop  1 Drop Left Eye BID    polyvinyl alcohol-povidone (NATURAL TEARS) 0.5-0.6 % ophthalmic solution 1 Drop  1 Drop Both Eyes PRN    LORazepam (ATIVAN) tablet 0.5 mg  0.5 mg Oral DAILY PRN    pravastatin (PRAVACHOL) tablet 20 mg  20 mg Oral QHS    sodium chloride (NS) flush 5-40 mL  5-40 mL IntraVENous Q8H    sodium chloride (NS) flush 5-40 mL  5-40 mL IntraVENous PRN    ondansetron (ZOFRAN) injection 4 mg  4 mg IntraVENous Q4H PRN    bisacodyL (DULCOLAX) tablet 5 mg  5 mg Oral DAILY PRN    metroNIDAZOLE (FLAGYL) IVPB premix 500 mg  500 mg IntraVENous Q8H    acetaminophen (TYLENOL) tablet 650 mg  650 mg Oral Q4H PRN    ketorolac (TORADOL) injection 15 mg  15 mg IntraVENous Q6H PRN    morphine injection 2 mg  2 mg IntraVENous Q4H PRN    vancomycin (VANCOCIN) 1250 mg in  ml infusion  1,250 mg IntraVENous Q16H    cefepime (MAXIPIME) 2 g in 0.9% sodium chloride (MBP/ADV) 100 mL  2 g IntraVENous Q12H    Vancomycin - pharmacy to dose   Other Rx Dosing/Monitoring   ______________________________________________________________________  EXPECTED LENGTH OF STAY: 3d 7h  ACTUAL LENGTH OF STAY:          3               Louise Wynn MD

## 2020-02-28 NOTE — PROGRESS NOTES
Problem: General Infection Care Plan (Adult and Pediatric)  Goal: Improvement in signs and symptoms of infection  Outcome: Progressing Towards Goal  Goal: *Optimize nutritional status  Outcome: Progressing Towards Goal     Problem: Patient Education: Go to Patient Education Activity  Goal: Patient/Family Education  Outcome: Progressing Towards Goal     Problem: Falls - Risk of  Goal: *Absence of Falls  Description  Document Thang Dixon Fall Risk and appropriate interventions in the flowsheet.   Outcome: Progressing Towards Goal  Note:   Fall Risk Interventions:    Medication Interventions: Evaluate medications/consider consulting pharmacy         History of Falls Interventions: Evaluate medications/consider consulting pharmacy

## 2020-02-28 NOTE — DISCHARGE SUMMARY
Discharge Summary       PATIENT ID: Deepika Stephens  MRN: [id number]   YOB: 1955    DATE OF ADMISSION: 2/24/2020 10:20 PM    DATE OF DISCHARGE: 2/28/2020   PRIMARY CARE PROVIDER: Tayler Amos MD     ATTENDING PHYSICIAN: Renato Gan MD  DISCHARGING PROVIDER: Renato Gan MD    To contact this individual call [phone] and ask the  to page. If unavailable ask to be transferred the Adult Hospitalist Department. CONSULTATIONS: IP CONSULT TO INFECTIOUS DISEASES    PROCEDURES/SURGERIES: * No surgery found *    ADMITTING DIAGNOSES & HOSPITAL COURSE:   # Left periorbital Cellulitis, better, on empiric po antibiotics per ID recommendations  # Eye itching  # Ovarian Ca   # HLD    Admission Summary:   64F p/w L eye area cellulitis/?abscess. On oral chemo for ovarian Ca    Interval history / Subjective: Follow up left [periorbital cellulitis. Patient seen and examined at the bedside. Labs, images and notes reviewed  Discussed with nursing staff, orders reviewed. Plan discussed with patient/Family. Pt is feeling well. Anxious to go home. ID ok with PO ABx and patient in agreement with the plan. DISCHARGE DIAGNOSES / PLAN:      #. Left periorbital Cellulitis: with possible abscess- L orbital area. Immunocompromised with chemo.  - CT confirmed collection of fluid suspicious for abscess. Ophthalmology evaluated, no surg now. - IV vanc/cefepime/ flagyl. ID following. Monitor closely. No leucocytosis, CRP wnl.  - ID ok with DC with PO Abx. EKG noted with normal QTc.   - DC with PO Zyvvox 600mh BID and Levofloxacin 750mg daily x 7days  - ID f/up set up on 3/5/20 at 01:45PM with Dr. Nuha Fna     # Eye itching  - Cont Zatidor gtt bid in left eye prn for itching  - Continue artificial tears  -Ophthal Dr. Ana Etienne f/up     # Ovarian Ca: hold oral Chemo until further per ID and Oncology. - F/u with oncology op.  3/9     # HLD: chronic, Stable, Home regimen     Code status: Full  DVT prophylaxis: SCD  Care Plan discussed with: Patient/Family and Nurse  Disposition: 24-48hrs, home likely     ADDITIONAL CARE RECOMMENDATIONS:   F/up with ID, Dr. Erich Madrigal as noted above  Fup with PCP in 1 week  Consider to see ophthalmologist if any worsening of pain, itching, redness or post hospital follow up  Follow up with your cancer specialist as suggested  · It is important that you take the medication exactly as they are prescribed. · Keep your medication in the bottles provided by the pharmacist and keep a list of the medication names, dosages, and times to be taken in your wallet. · Do not take other medications without consulting your doctor. · No drinking alcohol or driving car or operating machinery if you are on narcotic pain medications. Donot take sedating mediations if you are sleepy or confused. · Fall Precautions  · Keep Well Hydrated  · Report to your medical provider if you feel you have  developed allergies to medications  · Follow up with your PCP or Consultant for medication adjustments and refills  · Monitor for signs of fevers,chills,bleeding,chest pain and seek medical attention if you do so.      PENDING TEST RESULTS:   At the time of discharge the following test results are still pending: none    FOLLOW UP APPOINTMENTS:    Follow-up Information     Follow up With Specialties Details Why Contact Info    Andreas PRIEST, DO Infectious Diseases In 6 days F/up on 3/5/20 @ 1:45PM, for follow up with infectious disease specialist for check up [address] [facility]  [address] [facility]      Rachel Williamson MD Obstetrics & Gynecology, Gynecology, Obstetrics In 1 week As needed, If symptoms worsen for post hospital follow up [facility] [address] [facility]      Meliza Pinto MD Ophthalmology In 1 week eye f/up [address] [facility]              DIET: Regular Diet     ACTIVITY: Activity as tolerated      DISCHARGE MEDICATIONS:  Current Discharge Medication List      START taking these medications    Details   ketotifen (ZADITOR) 0.025 % (0.035 %) ophthalmic solution Administer 1 Drop to left eye two (2) times a day for 10 days. Qty: 1 Bottle, Refills: 0      L. acidoph & paracasei- S therm- Bifido (MAZIN-Q/RISAQUAD) 8 billion cell cap cap Take 1 Cap by mouth daily. Qty: 10 Cap, Refills: 0      polyvinyl alcohol-povidone (NATURAL TEARS) 0.5-0.6 % ophthalmic solution Administer 1 Drop to both eyes as needed (eye itching and pain). Qty: 10 mL, Refills: 0      linezolid (ZYVOX) 600 mg tablet Take 1 Tab by mouth two (2) times a day. Qty: 14 Tab, Refills: 0      levoFLOXacin (LEVAQUIN) 750 mg tablet Take 1 Tab by mouth daily. Qty: 7 Tab, Refills: 0         CONTINUE these medications which have NOT CHANGED    Details   pravastatin (PRAVACHOL) 20 mg tablet Take 20 mg by mouth nightly. olaparib (LYNPARZA) 150 mg tablet Take 300 mg by mouth two (2) times a day. LORazepam (ATIVAN) 0.5 mg tablet Take 0.5 mg by mouth.      methylPREDNISolone (MEDROL DOSEPACK) 4 mg tablet Take 4 mg by mouth Specific Days and Specific Times. NOTIFY YOUR PHYSICIAN FOR ANY OF THE FOLLOWING:   Fever over 101 degrees for 24 hours. Chest pain, shortness of breath, fever, chills, nausea, vomiting, diarrhea, change in mentation, falling, weakness, bleeding. Severe pain or pain not relieved by medications. Or, any other signs or symptoms that you may have questions about.     DISPOSITION:  x  Home With:   OT  PT  HH  RN       Long term SNF/Inpatient Rehab    Independent/assisted living    Hospice    Other:       PATIENT CONDITION AT DISCHARGE:     Functional status    Poor     Deconditioned    x Independent      Cognition    x Lucid     Forgetful     Dementia      Catheters/lines (plus indication)    Rasmussen     PICC     PEG    x None      Code status   x  Full code     DNR      PHYSICAL EXAMINATION AT DISCHARGE:  General:          Alert, cooperative, no distress, appears stated age. HEENT:           Atraumatic, anicteric sclerae, pink conjunctivae with erythama present but better. Resolved   periorbital and skin erythema on left. Similar to normal right side. No oral ulcers, mucosa   moist, throat clear, dentition fair  Neck:               Supple, symmetrical  Lungs:             Clear to auscultation bilaterally. No Wheezing or Rhonchi. No rales. Heart:              Regular  rhythm,  No  murmur   No edema  Abdomen:        Soft, non-tender. Not distended. Bowel sounds normal  Extremities:     No cyanosis. No clubbing,                            Skin turgor normal, Capillary refill normal  Skin:                Not pale. Not Jaundiced  No rashes   Psych:             Not anxious or agitated. Neurologic:      Alert, moves all extremities, answers questions appropriately and responds to commands       CHRONIC MEDICAL DIAGNOSES:  Problem List as of 2/28/2020 Date Reviewed: 2/25/2020          Codes Class Noted - Resolved    * (Principal) Periorbital cellulitis of left eye ICD-10-CM: L03.213  ICD-9-CM: 682.0  2/24/2020 - Present            Radiology  Ct Orbit Post Fossa W Cont    Addendum Date: 2/24/2020    Addendum: Addendum: There is a typographical error in the impression. There is no involvement of the retro-orbital structures    Result Date: 2/24/2020  IMPRESSION: 1. Soft tissue swelling anterior to the left orbit with curvilinear fluid collection anterior to the left globe which may represent an abscess collection.  There is involvement of the retro-orbital structures    Recent Results (from the past 24 hour(s))   CBC W/O DIFF    Collection Time: 02/28/20  3:17 AM   Result Value Ref Range    WBC 4.4 3.6 - 11.0 K/uL    RBC 3.78 (L) 3.80 - 5.20 M/uL    HGB 11.1 (L) 11.5 - 16.0 g/dL    HCT 34.6 (L) 35.0 - 47.0 %    MCV 91.5 80.0 - 99.0 FL    MCH 29.4 26.0 - 34.0 PG    MCHC 32.1 30.0 - 36.5 g/dL    RDW 13.1 11.5 - 14.5 %    PLATELET 277 082 - 765 K/uL    MPV 9.4 8.9 - 12.9 FL    NRBC 0.0 0  WBC    ABSOLUTE NRBC 0.00 0.00 - 8.32 K/uL   METABOLIC PANEL, COMPREHENSIVE    Collection Time: 02/28/20  3:17 AM   Result Value Ref Range    Sodium 140 136 - 145 mmol/L    Potassium 3.8 3.5 - 5.1 mmol/L    Chloride 110 (H) 97 - 108 mmol/L    CO2 24 21 - 32 mmol/L    Anion gap 6 5 - 15 mmol/L    Glucose 109 (H) 65 - 100 mg/dL    BUN 25 (H) 6 - 20 MG/DL    Creatinine 0.81 0.55 - 1.02 MG/DL    BUN/Creatinine ratio 31 (H) 12 - 20      GFR est AA >60 >60 ml/min/1.73m2    GFR est non-AA >60 >60 ml/min/1.73m2    Calcium 8.1 (L) 8.5 - 10.1 MG/DL    Bilirubin, total 0.2 0.2 - 1.0 MG/DL    ALT (SGPT) 30 12 - 78 U/L    AST (SGOT) 22 15 - 37 U/L    Alk.  phosphatase 76 45 - 117 U/L    Protein, total 6.0 (L) 6.4 - 8.2 g/dL    Albumin 2.7 (L) 3.5 - 5.0 g/dL    Globulin 3.3 2.0 - 4.0 g/dL    A-G Ratio 0.8 (L) 1.1 - 2.2     MAGNESIUM    Collection Time: 02/28/20  3:17 AM   Result Value Ref Range    Magnesium 1.4 (L) 1.6 - 2.4 mg/dL   EKG, 12 LEAD, INITIAL    Collection Time: 02/28/20 10:42 AM   Result Value Ref Range    Ventricular Rate 76 BPM    Atrial Rate 76 BPM    P-R Interval 174 ms    QRS Duration 78 ms    Q-T Interval 366 ms    QTC Calculation (Bezet) 411 ms    Calculated P Axis 63 degrees    Calculated R Axis 29 degrees    Calculated T Axis 46 degrees    Diagnosis       Normal sinus rhythm  Normal ECG  No previous ECGs available       Greater than 40 minutes were spent with the patient on counseling and coordination of care    Signed:   Rose Molina MD  2/28/2020  11:08 AM

## 2020-02-28 NOTE — DISCHARGE INSTRUCTIONS
Discharge Instructions       PATIENT ID: Jase Christianson  MRN: [id number]   YOB: 1955    DATE OF ADMISSION: 2/24/2020 10:20 PM    DATE OF DISCHARGE: 2/28/2020    PRIMARY CARE PROVIDER: Lele Ochoa MD     ATTENDING PHYSICIAN: Neha Pan MD  DISCHARGING PROVIDER: Paul Joel MD    To contact this individual call [phone] and ask the  to page. If unavailable ask to be transferred the Adult Hospitalist Department. DISCHARGE DIAGNOSES   # Left periorbital Cellulitis, better, on empiric po antibiotics per ID recommendations  # Eye itching  # Ovarian Ca   # HLD    CONSULTATIONS: IP CONSULT TO INFECTIOUS DISEASES    PROCEDURES/SURGERIES: * No surgery found *    PENDING TEST RESULTS:   At the time of discharge the following test results are still pending: none    FOLLOW UP APPOINTMENTS:   Follow-up Information     Follow up With Specialties Details Why Contact Info    Lyudmila PRIEST DO Infectious Diseases In 6 days F/up on 3/5/20 @ 1:45PM, for follow up with infectious disease specialist for check up [address] [place] [address]      Lele Ochoa MD Obstetrics & Gynecology, Gynecology, Obstetrics In 1 week As needed, If symptoms worsen for post hospital follow up Slick Dorantes MD Ophthalmology In 1 week eye f/up [address] [address]  [phone]             ADDITIONAL CARE RECOMMENDATIONS:   F/up with ID, Dr. Kehinde Dumont as noted above  Fup with PCP in 1 week  Consider to see ophthalmologist if any worsening of pain, itching, redness or post hospital follow up  Follow up with your cancer specialist as suggested  · It is important that you take the medication exactly as they are prescribed. · Keep your medication in the bottles provided by the pharmacist and keep a list of the medication names, dosages, and times to be taken in your wallet.    · Do not take other medications without consulting your doctor. · No drinking alcohol or driving car or operating machinery if you are on narcotic pain medications. Donot take sedating mediations if you are sleepy or confused. · Fall Precautions  · Keep Well Hydrated  · Report to your medical provider if you feel you have  developed allergies to medications  · Follow up with your PCP or Consultant for medication adjustments and refills  · Monitor for signs of fevers,chills,bleeding,chest pain and seek medical attention if you do so. DIET: Regular Diet    ACTIVITY: Activity as tolerated      DISCHARGE MEDICATIONS:   See Medication Reconciliation Form    · It is important that you take the medication exactly as they are prescribed. · Keep your medication in the bottles provided by the pharmacist and keep a list of the medication names, dosages, and times to be taken in your wallet. · Do not take other medications without consulting your doctor. NOTIFY YOUR PHYSICIAN FOR ANY OF THE FOLLOWING:   Fever over 101 degrees for 24 hours. Chest pain, shortness of breath, fever, chills, nausea, vomiting, diarrhea, change in mentation, falling, weakness, bleeding. Severe pain or pain not relieved by medications. Or, any other signs or symptoms that you may have questions about. DISPOSITION:  x  Home With:   OT  PT  HH  RN       SNF/Inpatient Rehab/LTAC    Independent/assisted living    Hospice    Other:     CDMP Checked:   Yes x     PROBLEM LIST Updated:  Yes x          My Medications      START taking these medications      Instructions Each Dose to Equal Morning Noon Evening Bedtime   ketotifen 0.025 % (0.035 %) ophthalmic solution  Commonly known as:  ZADITOR    Your last dose was: Your next dose is:          Administer 1 Drop to left eye two (2) times a day for 10 days.    1 Drop                 L. acidoph & paracasei- S therm- Bifido 8 billion cell Cap cap  Commonly known as: MAZIN-Q/RISAQUAD  Start taking on:  February 29, 2020    Your last dose was: Your next dose is: Take 1 Cap by mouth daily. 1 Cap                 levoFLOXacin 750 mg tablet  Commonly known as:  Levaquin    Your last dose was: Your next dose is: Take 1 Tab by mouth daily. 750 mg                 linezolid 600 mg tablet  Commonly known as:  Zyvox    Your last dose was: Your next dose is: Take 1 Tab by mouth two (2) times a day. 600 mg                 polyvinyl alcohol-povidone 0.5-0.6 % ophthalmic solution  Commonly known as:  NATURAL TEARS    Your last dose was: Your next dose is:          Administer 1 Drop to both eyes as needed (eye itching and pain). 1 Drop                    CONTINUE taking these medications      Instructions Each Dose to Equal Morning Noon Evening Bedtime   Ativan 0.5 mg tablet  Generic drug:  LORazepam    Your last dose was: Your next dose is: Take 0.5 mg by mouth. 0.5 mg                 Lynparza 150 mg tablet  Generic drug:  olaparib    Your last dose was: Your next dose is: Take 300 mg by mouth two (2) times a day. 300 mg                 methylPREDNISolone 4 mg tablet  Commonly known as:  MEDROL DOSEPACK    Your last dose was: Your next dose is: Take 4 mg by mouth Specific Days and Specific Times. 4 mg                 Pravachol 20 mg tablet  Generic drug:  pravastatin    Your last dose was: Your next dose is: Take 20 mg by mouth nightly. 20 mg                       Where to Get Your Medications      Information on where to get these meds will be given to you by the nurse or doctor.     Ask your nurse or doctor about these medications  · ketotifen 0.025 % (0.035 %) ophthalmic solution  · L. acidoph & paracasei- S therm- Bifido 8 billion cell Cap cap  · levoFLOXacin 750 mg tablet  · linezolid 600 mg tablet  · polyvinyl alcohol-povidone 0.5-0.6 % ophthalmic solution         Signed: Tree Herring MD  2/28/2020  11:03 AM

## 2020-03-01 LAB
BACTERIA SPEC CULT: NORMAL
SERVICE CMNT-IMP: NORMAL

## 2020-03-05 ENCOUNTER — OFFICE VISIT (OUTPATIENT)
Dept: FAMILY MEDICINE CLINIC | Age: 65
End: 2020-03-05

## 2020-03-05 VITALS
DIASTOLIC BLOOD PRESSURE: 80 MMHG | BODY MASS INDEX: 33.11 KG/M2 | SYSTOLIC BLOOD PRESSURE: 118 MMHG | WEIGHT: 206 LBS | HEART RATE: 83 BPM | RESPIRATION RATE: 18 BRPM | TEMPERATURE: 97.8 F | HEIGHT: 66 IN | OXYGEN SATURATION: 100 %

## 2020-03-05 DIAGNOSIS — L02.01 FACIAL ABSCESS: Primary | ICD-10-CM

## 2020-03-08 NOTE — PROGRESS NOTES
Infectious Disease clinic note    Ms. Nain Alvarez is here for follow-up of facial cellulitis    This is a hospital follow-up        HPI she is taking antibiotics which include Zyvox and Levaquin  And is almost completing her course. Denied any side effects including nausea  Edema, erythema improving of eyelids resolved  Denied nausea, vomiting diarrhea   Denied fever, chills          Physical Exam:    Vitals:   Patient Vitals for the past 24 hrs:   Temp Pulse Resp BP SpO2   20 0859 97.4 °F (36.3 °C) 85 18 137/80 95 %   20 0201 97.4 °F (36.3 °C) 76 18 118/77 95 %   20 98.3 °F (36.8 °C) 84 18 113/75 94 %     · GEN: NAD  · HEENT no edema or erythema of the face  ·  CV: S1, S2 heard regularly, conjunctival erythema ,   · Lungs: Clear to auscultation bilaterally  · Abdomen: soft, non distended, non tender,   · Extremities: no edema  · Skin: no rash        Labs:   No results found for this or any previous visit (from the past 24 hour(s)). Reviewed from 2020 white count 4.4 platelets 007 hemoglobin 11.1   Creatinine 0.81 BUN 25 ALT 30 AST 22      Microbiology Data:       Blood:20  6:07 AM - [place], [facility]     Specimen Information: Blood        Component Value Ref Range & Units Status   Special Requests: NO SPECIAL REQUESTS    Preliminary   Culture result: NO GROWTH AFTER 14 HOURS    Preliminary   Result History             Imagin/24/20  EXAM: CT orbits. No comparisons.     Thin section axial images were obtained after the intravenous administration of  100 cc of Isovue-370. From these sagittal and coronal reformats were performed. CT dose reduction was achieved through use of a standardized protocol tailored  for this examination and automatic exposure control for dose modulation.      FINDINGS: There is soft tissue swelling anterior to the left globe. There is a  curvilinear fluid collection anterior to the left globe measuring 1.6 x 0.7 x  1.5 cm.  The retro-orbital fat is normal. The ocular muscles are normal. Optic  nerve is normal. Suprasellar cistern and cavernous sinuses are unremarkable. Sinuses are patent. No fracture     IMPRESSION  IMPRESSION:  1. Soft tissue swelling anterior to the left orbit with curvilinear fluid  collection anterior to the left globe which may represent an abscess collection. There is involvement of the retro-orbital structures    Assessment / Plan:     Ms Yazmin Galvan is a 62year old lad with a history of breast cancer status post mastectomy, ovarian cancer and oral chemotherapy with Olaparib admitted with orbital/facial cellulitis/abscess.      1) orbital/facial cellulitis with concern for fluid collection anterior to the left globe/abscess   Clinically improved on antibiotics Vancomycin, Cefepime and Flagyl   Seen by ophthalmology while admitted at [facility]  Discharged on Zyvox 600 mg bid  + Levaquin 750 mg daily for a week  Today she has no signs or symptoms of cellulitis of her face or edema  Counseled to monitor once antibiotics have been discontinued  Recommended to avoid any facial make-up for another week off antibiotics to ensure she has no recurrence    Side effects of antibiotic including tendinopathy, GI disturbances, nausea vomiting diarrhea discussed     If any symptoms of edema erythema post antibiotic discontinuation she was told to call us immediately and or seek medical attention          2) ovarian cancer  Status post systemic and intraperitoneal chemotherapy in the past  Has an indwelling port on the right chest wall  Was on Olaparib     3) Breast cancer hx   S/P Mastectomy    4)  DVT Px